# Patient Record
Sex: MALE | Race: WHITE | Employment: FULL TIME | URBAN - METROPOLITAN AREA
[De-identification: names, ages, dates, MRNs, and addresses within clinical notes are randomized per-mention and may not be internally consistent; named-entity substitution may affect disease eponyms.]

---

## 2019-06-06 DIAGNOSIS — M25.552 LEFT HIP PAIN: Primary | ICD-10-CM

## 2019-06-07 ENCOUNTER — OFFICE VISIT (OUTPATIENT)
Dept: ORTHOPEDIC SURGERY | Age: 59
End: 2019-06-07
Payer: COMMERCIAL

## 2019-06-07 VITALS — BODY MASS INDEX: 40.95 KG/M2 | HEIGHT: 73 IN | WEIGHT: 309 LBS

## 2019-06-07 DIAGNOSIS — Z01.818 PRE-OP TESTING: ICD-10-CM

## 2019-06-07 DIAGNOSIS — M16.12 ARTHRITIS OF LEFT HIP: Primary | ICD-10-CM

## 2019-06-07 PROCEDURE — 99203 OFFICE O/P NEW LOW 30 MIN: CPT | Performed by: ORTHOPAEDIC SURGERY

## 2019-06-07 RX ORDER — QUINAPRIL HCL AND HYDROCHLOROTHIAZIDE 20; 25 MG/1; MG/1
1 TABLET ORAL DAILY
COMMUNITY
Start: 2019-05-17

## 2019-06-07 RX ORDER — ASPIRIN 81 MG/1
81 TABLET, CHEWABLE ORAL DAILY
COMMUNITY

## 2019-06-07 RX ORDER — IBUPROFEN 800 MG/1
1 TABLET ORAL PRN
COMMUNITY
Start: 2019-02-15

## 2019-06-07 SDOH — HEALTH STABILITY: MENTAL HEALTH: HOW OFTEN DO YOU HAVE A DRINK CONTAINING ALCOHOL?: NEVER

## 2019-06-07 SDOH — HEALTH STABILITY: MENTAL HEALTH: HOW MANY STANDARD DRINKS CONTAINING ALCOHOL DO YOU HAVE ON A TYPICAL DAY?: 1 OR 2

## 2019-06-07 ASSESSMENT — ENCOUNTER SYMPTOMS
COUGH: 0
NAUSEA: 0
CONSTIPATION: 0
DIARRHEA: 0

## 2019-06-07 NOTE — PROGRESS NOTES
[address] [place] [address]  Dept: [phone]    Ambulatory Orthopedic New Patient Visit      CHIEF COMPLAINT:    Chief Complaint   Patient presents with    Hip Pain     left       HISTORY OF PRESENT ILLNESS:      The patient is a 66 y.o. male who is being seen  for consultation and evaluation of left hip pain. Ranjit Vidal presents with a 2 years history of pain in the left and bilateral hip. The pain does radiate into the thigh and into the groin. The pain is   made worse with weightbearing. The pain is worse at night. The pain is worse when laying on the affected side. The pain is made better with rest and ibuprofen. Patient has lost weight from 365lbs down to 275lbs but due to taking care of his wife recently he gained a few pounds. Patient said he will be under 40 by the time of surgery. Patient recently started metformin for his diabetes. He thinks is A1c is around 7.0. Past Medical History:    No past medical history on file. Past Surgical History:    No past surgical history on file. Current Medications:   Current Outpatient Medications   Medication Sig Dispense Refill    Misc. Devices MISC Shower Bench 1 Device 0    Misc. Devices MISC Rolling  Walker 1 Device 0    Misc. Devices MISC Bedside Commode 1 Device 0    ibuprofen (ADVIL;MOTRIN) 800 MG tablet Take 1 tablet by mouth as needed      sitaGLIPtan-metformin (JANUMET)  MG per tablet Take 1 tablet by mouth daily      aspirin (DULCE LOW DOSE) 81 MG chewable tablet Take 81 mg by mouth daily      quinapril-hydrochlorothiazide (ACCURETIC) 20-25 MG per tablet Take 1 tablet by mouth daily       No current facility-administered medications for this visit. Allergies:    Patient has no known allergies.     Social History:   Social History     Socioeconomic History    Marital status:      Spouse name: Not on file    Number of children: Not on file  Years of education: Not on file    Highest education level: Not on file   Occupational History    Not on file   Social Needs    Financial resource strain: Not on file    Food insecurity:     Worry: Not on file     Inability: Not on file    Transportation needs:     Medical: Not on file     Non-medical: Not on file   Tobacco Use    Smoking status: Never Smoker    Smokeless tobacco: Never Used   Substance and Sexual Activity    Alcohol use: Yes     Frequency: Never     Drinks per session: 1 or 2     Binge frequency: Never    Drug use: Never    Sexual activity: Not on file   Lifestyle    Physical activity:     Days per week: Not on file     Minutes per session: Not on file    Stress: Not on file   Relationships    Social connections:     Talks on phone: Not on file     Gets together: Not on file     Attends Latter day service: Not on file     Active member of club or organization: Not on file     Attends meetings of clubs or organizations: Not on file     Relationship status: Not on file    Intimate partner violence:     Fear of current or ex partner: Not on file     Emotionally abused: Not on file     Physically abused: Not on file     Forced sexual activity: Not on file   Other Topics Concern    Not on file   Social History Narrative    Not on file       Family History:  No family history on file. REVIEW OF SYSTEMS:  Review of Systems   Constitutional: Negative for chills and fever. Respiratory: Negative for cough. Gastrointestinal: Negative for constipation, diarrhea and nausea. Musculoskeletal: Positive for arthralgias (left hip). Negative for gait problem, joint swelling and myalgias. Neurological: Negative for dizziness, weakness and numbness. I have reviewed the CC, HPI, ROS, PMH, FHX, Social History. I agree with the documentation provided by other staff, residents and havereviewed their documentation prior to providing my signature indicating agreement.     PHYSICAL EXAM:  Ht 6' 1\" (1.854 m)   Wt (!) 309 lb (140.2 kg)   BMI 40.77 kg/m²  Body mass index is 40.77 kg/m². Physical Exam  Gen: alert and oriented  Psych:  Appropriate affect; Appropriate knowledge base; Appropriate mood; No hallucinations; Head: normocephalic atraumatic   Chest: symmetric chest excursion  Pelvis: stable  Ortho Exam  Extremity:Patient ambulates with mild shortening weightbearing phase and antalgic gait to the Left lower extremity. There is no erythema, warmth, skin lesions, signs of infection. Positive hip logroll and Stinchfield test is noted. Patient has full range of motion of the Left knee and ankle. Motor, sensory, and vascular examination to the Left lower extremity is intact without focal deficits. Patient has full range of motion of the lumbosacral spine. Radiology:     Xr Hip Left (2-3 Views)    Result Date: 6/9/2019  History: Left hip pain. Findings: Low AP pelvis and frog-leg lateral x-ray of the left hip done the office today shows severe degenerative narrowing of the left hip joint with periarticular osteophytosis and joint line sclerosis and subchondral sig take changes at the femoral head and acetabulum. No evidence of fracture, subluxation, dislocation, radiopaque foreign body, radiopaque tumor is noted. Moderate degenerative narrowing of the right hip joint is also appreciated. Impression: Severe left hip degenerative changes and moderate right hip degenerative changes as described above. ASSESSMENT:     1. Arthritis of left hip    2. Pre-op testing         PLAN:       Discussed etiology and natural history of left hip arthritis. The treatment options may include oral anti-inflammatories, bracing, injections, advanced imaging, activity modification, physical therapy and/or surgical intervention. The patient would like to proceed with left total hip arthroplasty. The patient will follow up two weeks post op.   We discussed that the patient should call us with any

## 2019-06-07 NOTE — LETTER
[address] [place] [address] [place] [address]  Dept: [phone]  Dept Fax: [phone]      Dental Evaluation for Surgery    Patient Name:  Alison Carlos    1960     Type of Surgery:   Left total hip replacement    Patient has been medically evaluated for the above surgery. __________He/She is void of any signs of infection or active periodontal disease, dental caries or the need for extractions. Oral health is satisfactory for the above procedure.      __________He/She is in need of further dental care and follow up. Thank you for your consultation. Should you have any questions, please do not hesitate to call the office.       Sincerely,         [facility]           Sign_____________________________________________Date__________________

## 2019-07-01 NOTE — CARE COORDINATION
[address] Replacement Pre-surgical Assessment    Patient Name:            Nicole Mitchell is a 66 y.o. male    Date of Surgery:  8/12  Procedure:   tka  Surgeon:   Shimon Hasnen Name:   spouse  Payor:    private  CJR Bundle? No  PCP:    Carter Hernandez  Last time seen PCP:   for clearance    Pharmacy Name/Location:        Intended Discharge Plan:  Home Spouse is a PT. He wants to do home program until first post op visit  Stated Goals of the surgery relieve pain    Living Situation:   Living arrangements: with spouse       Home: single family home   Bedroom Location: Upstairs - actually he [place] [address] [place] [address] [place] [address] Rd. But he has arranged for everything to be on first level   Bathroom Location: Upstairs   Steps: yes    # of steps to enter home 1 and then all at first will be on that level, walk in shower, no chair. Social support:a good social support network   Any concerns about safety in home? no    Risk Analysis:  Surgery Risks: None  Safety Risks: None identified   Chronic Illnesses: dm and htn. States controlled, working on A1c  Pre-Op Clearance received? Reviewing media for notes  PAT scheduled or completed? Yes   Pre-optimization needed? Waiting for updated a1c     Self-Care, Fall Risk & DME:   Any previous falls or injuries in the home? No   Visual Impairement:  corrected   Difficulty hearing: Within Normal Limit   Smoking: The patient denies any lifetime nicotine/tobacco use. Alcohol Consumption: occ   Patients judgement adequate to safely complete daily activities? yes   Patients memory adequate to safely complete daily activities? yes   Able to express needs/desires? yes   Any difficulty with activities of daily living? no    Does patient have difficulty walking or climbing stairs? Yes - has limp   Currently drives? Yes  Walks in Home: Independent  Uses any assistive devices?  No Device   Current home equipment: walker     Financial Assessment   Insurance: Commercial   Afford medications? yes   Adequate food? yes   Adequate housing? yes   Connected with any outside sevices (Passport, etc):No   Mode of transportation? car     Health Literacy Assessment   Primary Language: English   Does anyone help with medications at home?  no  Anything preventing from taking medications at home?  no    Barriers to an acceptable plan of care for this episode none  Patient is ready, willing and able to participate yes   Family or others are available, ready, willing and able to assist yes     Anticipated Needs Post Evaluation:  Patient intends to discharge to Outpatient Therapy - states he will do home program for 2 weeks and then, likely to [facility] (if applicable)  As above  Patient does not need a walker with wheels. Patient registered for pre-op class, will attend class on July 29 when he has pat. ..ibuprofen did jus tlook though and it looks like pat may be at [facility]. I will clarify this and may reschedule class. Patient is in agreement with the Plan.   Electronically signed by: Kamron Arias RN, Ortho Navigator on 7/1/2019 at 3:56 PM

## 2019-07-29 ENCOUNTER — HOSPITAL ENCOUNTER (OUTPATIENT)
Dept: PREADMISSION TESTING | Age: 59
Discharge: HOME | End: 2019-08-02
Payer: COMMERCIAL

## 2019-07-29 ENCOUNTER — HOSPITAL ENCOUNTER (OUTPATIENT)
Dept: PREADMISSION TESTING | Age: 59
Discharge: HOME | End: 2019-07-29

## 2019-07-29 ENCOUNTER — HOSPITAL ENCOUNTER (OUTPATIENT)
Dept: GENERAL RADIOLOGY | Age: 59
Discharge: HOME | End: 2019-07-31
Payer: COMMERCIAL

## 2019-07-29 VITALS
SYSTOLIC BLOOD PRESSURE: 104 MMHG | TEMPERATURE: 98.2 F | OXYGEN SATURATION: 96 % | RESPIRATION RATE: 18 BRPM | BODY MASS INDEX: 37.91 KG/M2 | DIASTOLIC BLOOD PRESSURE: 66 MMHG | HEART RATE: 68 BPM | HEIGHT: 73 IN | WEIGHT: 286 LBS

## 2019-07-29 LAB
ALBUMIN SERPL-MCNC: 4.5 G/DL (ref 3.5–5.2)
ALBUMIN/GLOBULIN RATIO: 1.7 (ref 1–2.5)
ALP BLD-CCNC: 35 U/L (ref 40–129)
ALT SERPL-CCNC: 21 U/L (ref 5–41)
ANION GAP SERPL CALCULATED.3IONS-SCNC: 15 MMOL/L (ref 9–17)
AST SERPL-CCNC: 17 U/L
BILIRUB SERPL-MCNC: 0.73 MG/DL (ref 0.3–1.2)
BILIRUBIN URINE: NEGATIVE
BUN BLDV-MCNC: 12 MG/DL (ref 6–20)
BUN/CREAT BLD: ABNORMAL (ref 9–20)
CALCIUM SERPL-MCNC: 9.5 MG/DL (ref 8.6–10.4)
CHLORIDE BLD-SCNC: 102 MMOL/L (ref 98–107)
CO2: 24 MMOL/L (ref 20–31)
COLOR: YELLOW
COMMENT UA: ABNORMAL
CREAT SERPL-MCNC: 0.84 MG/DL (ref 0.7–1.2)
EKG ATRIAL RATE: 63 BPM
EKG P AXIS: 55 DEGREES
EKG P-R INTERVAL: 218 MS
EKG Q-T INTERVAL: 434 MS
EKG QRS DURATION: 90 MS
EKG QTC CALCULATION (BAZETT): 444 MS
EKG R AXIS: 28 DEGREES
EKG T AXIS: 7 DEGREES
EKG VENTRICULAR RATE: 63 BPM
GFR AFRICAN AMERICAN: >60 ML/MIN
GFR NON-AFRICAN AMERICAN: >60 ML/MIN
GFR SERPL CREATININE-BSD FRML MDRD: ABNORMAL ML/MIN/{1.73_M2}
GFR SERPL CREATININE-BSD FRML MDRD: ABNORMAL ML/MIN/{1.73_M2}
GLUCOSE BLD-MCNC: 116 MG/DL (ref 70–99)
GLUCOSE URINE: NEGATIVE
HCT VFR BLD CALC: 43.5 % (ref 40.7–50.3)
HEMOGLOBIN: 14.1 G/DL (ref 13–17)
KETONES, URINE: ABNORMAL
LEUKOCYTE ESTERASE, URINE: NEGATIVE
MCH RBC QN AUTO: 27.9 PG (ref 25.2–33.5)
MCHC RBC AUTO-ENTMCNC: 32.4 G/DL (ref 28.4–34.8)
MCV RBC AUTO: 86.1 FL (ref 82.6–102.9)
NITRITE, URINE: NEGATIVE
NRBC AUTOMATED: 0 PER 100 WBC
PDW BLD-RTO: 13 % (ref 11.8–14.4)
PH UA: 6 (ref 5–8)
PLATELET # BLD: 245 K/UL (ref 138–453)
PMV BLD AUTO: 10 FL (ref 8.1–13.5)
POTASSIUM SERPL-SCNC: 3.6 MMOL/L (ref 3.7–5.3)
PROTEIN UA: NEGATIVE
RBC # BLD: 5.05 M/UL (ref 4.21–5.77)
SODIUM BLD-SCNC: 141 MMOL/L (ref 135–144)
SPECIFIC GRAVITY UA: 1.01 (ref 1–1.03)
TOTAL PROTEIN: 7.1 G/DL (ref 6.4–8.3)
TURBIDITY: CLEAR
URINE HGB: NEGATIVE
UROBILINOGEN, URINE: NORMAL
WBC # BLD: 7.6 K/UL (ref 3.5–11.3)

## 2019-07-29 PROCEDURE — 81003 URINALYSIS AUTO W/O SCOPE: CPT

## 2019-07-29 PROCEDURE — 85027 COMPLETE CBC AUTOMATED: CPT

## 2019-07-29 PROCEDURE — 36415 COLL VENOUS BLD VENIPUNCTURE: CPT

## 2019-07-29 PROCEDURE — 93005 ELECTROCARDIOGRAM TRACING: CPT | Performed by: ORTHOPAEDIC SURGERY

## 2019-07-29 PROCEDURE — 71046 X-RAY EXAM CHEST 2 VIEWS: CPT

## 2019-07-29 PROCEDURE — 87641 MR-STAPH DNA AMP PROBE: CPT

## 2019-07-29 PROCEDURE — 80053 COMPREHEN METABOLIC PANEL: CPT

## 2019-07-29 RX ORDER — SILDENAFIL 100 MG/1
100 TABLET, FILM COATED ORAL PRN
COMMUNITY
Start: 2018-12-21

## 2019-07-29 RX ORDER — SODIUM CHLORIDE, SODIUM LACTATE, POTASSIUM CHLORIDE, CALCIUM CHLORIDE 600; 310; 30; 20 MG/100ML; MG/100ML; MG/100ML; MG/100ML
1000 INJECTION, SOLUTION INTRAVENOUS CONTINUOUS
Status: CANCELLED | OUTPATIENT
Start: 2019-07-29

## 2019-07-29 SDOH — HEALTH STABILITY: MENTAL HEALTH: HOW OFTEN DO YOU HAVE A DRINK CONTAINING ALCOHOL?: MONTHLY OR LESS

## 2019-07-29 ASSESSMENT — PAIN DESCRIPTION - DESCRIPTORS: DESCRIPTORS: CONSTANT;ACHING

## 2019-07-29 ASSESSMENT — PAIN DESCRIPTION - ORIENTATION: ORIENTATION: LEFT

## 2019-07-29 ASSESSMENT — PAIN DESCRIPTION - PAIN TYPE: TYPE: CHRONIC PAIN

## 2019-07-29 ASSESSMENT — PAIN DESCRIPTION - PROGRESSION: CLINICAL_PROGRESSION: GRADUALLY WORSENING

## 2019-07-29 ASSESSMENT — PAIN DESCRIPTION - ONSET: ONSET: ON-GOING

## 2019-07-29 ASSESSMENT — PAIN DESCRIPTION - FREQUENCY: FREQUENCY: CONTINUOUS

## 2019-07-29 ASSESSMENT — PAIN DESCRIPTION - LOCATION: LOCATION: HIP

## 2019-07-29 ASSESSMENT — PAIN SCALES - GENERAL: PAINLEVEL_OUTOF10: 5

## 2019-07-30 ENCOUNTER — ANESTHESIA EVENT (OUTPATIENT)
Dept: OPERATING ROOM | Age: 59
DRG: 470 | End: 2019-07-30
Payer: COMMERCIAL

## 2019-07-30 LAB
MRSA, DNA, NASAL: NORMAL
SPECIMEN DESCRIPTION: NORMAL

## 2019-08-01 DIAGNOSIS — Z01.818 PRE-OP TESTING: Primary | ICD-10-CM

## 2019-08-05 DIAGNOSIS — M16.12 ARTHRITIS OF LEFT HIP: Primary | ICD-10-CM

## 2019-08-12 ENCOUNTER — ANESTHESIA (OUTPATIENT)
Dept: OPERATING ROOM | Age: 59
DRG: 470 | End: 2019-08-12
Payer: COMMERCIAL

## 2019-08-12 ENCOUNTER — APPOINTMENT (OUTPATIENT)
Dept: GENERAL RADIOLOGY | Age: 59
DRG: 470 | End: 2019-08-12
Attending: ORTHOPAEDIC SURGERY
Payer: COMMERCIAL

## 2019-08-12 ENCOUNTER — HOSPITAL ENCOUNTER (INPATIENT)
Age: 59
LOS: 1 days | Discharge: HOME | DRG: 470 | End: 2019-08-13
Attending: ORTHOPAEDIC SURGERY | Admitting: ORTHOPAEDIC SURGERY
Payer: COMMERCIAL

## 2019-08-12 VITALS — DIASTOLIC BLOOD PRESSURE: 52 MMHG | OXYGEN SATURATION: 97 % | SYSTOLIC BLOOD PRESSURE: 86 MMHG | TEMPERATURE: 94.8 F

## 2019-08-12 DIAGNOSIS — Z96.642 STATUS POST TOTAL REPLACEMENT OF LEFT HIP: Primary | ICD-10-CM

## 2019-08-12 PROBLEM — M16.12 ARTHRITIS OF LEFT HIP: Status: ACTIVE | Noted: 2019-06-01

## 2019-08-12 PROBLEM — E87.6 HYPOKALEMIA: Status: ACTIVE | Noted: 2019-08-12

## 2019-08-12 LAB
GLUCOSE BLD-MCNC: 139 MG/DL (ref 75–110)
GLUCOSE BLD-MCNC: 163 MG/DL (ref 75–110)
GLUCOSE BLD-MCNC: 171 MG/DL (ref 75–110)
GLUCOSE BLD-MCNC: 195 MG/DL (ref 75–110)
GLUCOSE BLD-MCNC: 219 MG/DL (ref 75–110)
POTASSIUM SERPL-SCNC: 4.1 MMOL/L (ref 3.7–5.3)

## 2019-08-12 PROCEDURE — 2720000010 HC SURG SUPPLY STERILE: Performed by: ORTHOPAEDIC SURGERY

## 2019-08-12 PROCEDURE — 6360000002 HC RX W HCPCS: Performed by: ORTHOPAEDIC SURGERY

## 2019-08-12 PROCEDURE — 99253 IP/OBS CNSLTJ NEW/EST LOW 45: CPT | Performed by: INTERNAL MEDICINE

## 2019-08-12 PROCEDURE — 6370000000 HC RX 637 (ALT 250 FOR IP): Performed by: ANESTHESIOLOGY

## 2019-08-12 PROCEDURE — 2500000003 HC RX 250 WO HCPCS: Performed by: NURSE ANESTHETIST, CERTIFIED REGISTERED

## 2019-08-12 PROCEDURE — 7100000001 HC PACU RECOVERY - ADDTL 15 MIN: Performed by: ORTHOPAEDIC SURGERY

## 2019-08-12 PROCEDURE — 6370000000 HC RX 637 (ALT 250 FOR IP): Performed by: STUDENT IN AN ORGANIZED HEALTH CARE EDUCATION/TRAINING PROGRAM

## 2019-08-12 PROCEDURE — 6360000002 HC RX W HCPCS: Performed by: STUDENT IN AN ORGANIZED HEALTH CARE EDUCATION/TRAINING PROGRAM

## 2019-08-12 PROCEDURE — 2580000003 HC RX 258: Performed by: STUDENT IN AN ORGANIZED HEALTH CARE EDUCATION/TRAINING PROGRAM

## 2019-08-12 PROCEDURE — 3209999900 FLUORO FOR SURGICAL PROCEDURES

## 2019-08-12 PROCEDURE — 97110 THERAPEUTIC EXERCISES: CPT

## 2019-08-12 PROCEDURE — 2500000003 HC RX 250 WO HCPCS: Performed by: ORTHOPAEDIC SURGERY

## 2019-08-12 PROCEDURE — 3700000001 HC ADD 15 MINUTES (ANESTHESIA): Performed by: ORTHOPAEDIC SURGERY

## 2019-08-12 PROCEDURE — 0SRB03Z REPLACEMENT OF LEFT HIP JOINT WITH CERAMIC SYNTHETIC SUBSTITUTE, OPEN APPROACH: ICD-10-PCS | Performed by: ORTHOPAEDIC SURGERY

## 2019-08-12 PROCEDURE — 6370000000 HC RX 637 (ALT 250 FOR IP)

## 2019-08-12 PROCEDURE — 3600000013 HC SURGERY LEVEL 3 ADDTL 15MIN: Performed by: ORTHOPAEDIC SURGERY

## 2019-08-12 PROCEDURE — 97116 GAIT TRAINING THERAPY: CPT

## 2019-08-12 PROCEDURE — 2580000003 HC RX 258: Performed by: ORTHOPAEDIC SURGERY

## 2019-08-12 PROCEDURE — 6370000000 HC RX 637 (ALT 250 FOR IP): Performed by: INTERNAL MEDICINE

## 2019-08-12 PROCEDURE — 3600000003 HC SURGERY LEVEL 3 BASE: Performed by: ORTHOPAEDIC SURGERY

## 2019-08-12 PROCEDURE — C1776 JOINT DEVICE (IMPLANTABLE): HCPCS | Performed by: ORTHOPAEDIC SURGERY

## 2019-08-12 PROCEDURE — 1200000000 HC SEMI PRIVATE

## 2019-08-12 PROCEDURE — 2709999900 HC NON-CHARGEABLE SUPPLY: Performed by: ORTHOPAEDIC SURGERY

## 2019-08-12 PROCEDURE — 7100000000 HC PACU RECOVERY - FIRST 15 MIN: Performed by: ORTHOPAEDIC SURGERY

## 2019-08-12 PROCEDURE — 3700000000 HC ANESTHESIA ATTENDED CARE: Performed by: ORTHOPAEDIC SURGERY

## 2019-08-12 PROCEDURE — 73502 X-RAY EXAM HIP UNI 2-3 VIEWS: CPT

## 2019-08-12 PROCEDURE — 2580000003 HC RX 258: Performed by: ANESTHESIOLOGY

## 2019-08-12 PROCEDURE — 6360000002 HC RX W HCPCS: Performed by: NURSE ANESTHETIST, CERTIFIED REGISTERED

## 2019-08-12 PROCEDURE — 82947 ASSAY GLUCOSE BLOOD QUANT: CPT

## 2019-08-12 PROCEDURE — 2500000003 HC RX 250 WO HCPCS: Performed by: ANESTHESIOLOGY

## 2019-08-12 PROCEDURE — 97162 PT EVAL MOD COMPLEX 30 MIN: CPT

## 2019-08-12 PROCEDURE — 84132 ASSAY OF SERUM POTASSIUM: CPT

## 2019-08-12 PROCEDURE — 76942 ECHO GUIDE FOR BIOPSY: CPT | Performed by: ANESTHESIOLOGY

## 2019-08-12 PROCEDURE — 27130 TOTAL HIP ARTHROPLASTY: CPT | Performed by: ORTHOPAEDIC SURGERY

## 2019-08-12 PROCEDURE — 2580000003 HC RX 258: Performed by: NURSE ANESTHETIST, CERTIFIED REGISTERED

## 2019-08-12 PROCEDURE — 36415 COLL VENOUS BLD VENIPUNCTURE: CPT

## 2019-08-12 PROCEDURE — 97535 SELF CARE MNGMENT TRAINING: CPT

## 2019-08-12 PROCEDURE — 97166 OT EVAL MOD COMPLEX 45 MIN: CPT

## 2019-08-12 DEVICE — CEMENTLESS ANATOMICAL STEM LEFT SIZE 5
Type: IMPLANTABLE DEVICE | Status: FUNCTIONAL
Brand: MINIMAX STEMS

## 2019-08-12 DEVICE — COMPONENT TOT HIP CAPPED ADV STRYHIPA] STRYKER CORP]: Type: IMPLANTABLE DEVICE | Status: FUNCTIONAL

## 2019-08-12 DEVICE — FEMORAL HEAD Ø 36 SIZE M
Type: IMPLANTABLE DEVICE | Status: FUNCTIONAL
Brand: MECTACER BIOLOX DELTA FEMORAL BALL HEAD

## 2019-08-12 DEVICE — FLAT PE  HC LINER Ø 36 / F
Type: IMPLANTABLE DEVICE | Status: FUNCTIONAL
Brand: MPACT ACETABULAR SYSTEM

## 2019-08-12 DEVICE — ACETABULAR SHELL Ø58 TWO HOLES
Type: IMPLANTABLE DEVICE | Status: FUNCTIONAL
Brand: MPACT ACETABULAR SYSTEM

## 2019-08-12 RX ORDER — DEXTROSE MONOHYDRATE 25 G/50ML
12.5 INJECTION, SOLUTION INTRAVENOUS PRN
Status: DISCONTINUED | OUTPATIENT
Start: 2019-08-12 | End: 2019-08-13 | Stop reason: HOSPADM

## 2019-08-12 RX ORDER — DOCUSATE SODIUM 100 MG/1
100 CAPSULE, LIQUID FILLED ORAL 2 TIMES DAILY PRN
Qty: 60 CAPSULE | Refills: 0 | Status: ON HOLD | OUTPATIENT
Start: 2019-08-12 | End: 2022-09-09 | Stop reason: HOSPADM

## 2019-08-12 RX ORDER — DEXTROSE MONOHYDRATE 50 MG/ML
100 INJECTION, SOLUTION INTRAVENOUS PRN
Status: DISCONTINUED | OUTPATIENT
Start: 2019-08-12 | End: 2019-08-13 | Stop reason: HOSPADM

## 2019-08-12 RX ORDER — SODIUM CHLORIDE 0.9 % (FLUSH) 0.9 %
10 SYRINGE (ML) INJECTION EVERY 12 HOURS SCHEDULED
Status: DISCONTINUED | OUTPATIENT
Start: 2019-08-12 | End: 2019-08-13 | Stop reason: HOSPADM

## 2019-08-12 RX ORDER — QUINAPRIL HCL AND HYDROCHLOROTHIAZIDE 20; 25 MG/1; MG/1
1 TABLET ORAL DAILY
Status: DISCONTINUED | OUTPATIENT
Start: 2019-08-12 | End: 2019-08-13 | Stop reason: HOSPADM

## 2019-08-12 RX ORDER — SODIUM CHLORIDE, SODIUM LACTATE, POTASSIUM CHLORIDE, CALCIUM CHLORIDE 600; 310; 30; 20 MG/100ML; MG/100ML; MG/100ML; MG/100ML
INJECTION, SOLUTION INTRAVENOUS CONTINUOUS PRN
Status: DISCONTINUED | OUTPATIENT
Start: 2019-08-12 | End: 2019-08-12 | Stop reason: SDUPTHER

## 2019-08-12 RX ORDER — TRAMADOL HYDROCHLORIDE 50 MG/1
50 TABLET ORAL EVERY 6 HOURS
Status: DISCONTINUED | OUTPATIENT
Start: 2019-08-12 | End: 2019-08-12

## 2019-08-12 RX ORDER — OXYCODONE HYDROCHLORIDE AND ACETAMINOPHEN 5; 325 MG/1; MG/1
1 TABLET ORAL EVERY 6 HOURS PRN
Qty: 28 TABLET | Refills: 0 | Status: SHIPPED | OUTPATIENT
Start: 2019-08-12 | End: 2019-08-19

## 2019-08-12 RX ORDER — MIDAZOLAM HYDROCHLORIDE 1 MG/ML
INJECTION INTRAMUSCULAR; INTRAVENOUS PRN
Status: DISCONTINUED | OUTPATIENT
Start: 2019-08-12 | End: 2019-08-12 | Stop reason: SDUPTHER

## 2019-08-12 RX ORDER — PROPOFOL 10 MG/ML
INJECTION, EMULSION INTRAVENOUS CONTINUOUS PRN
Status: DISCONTINUED | OUTPATIENT
Start: 2019-08-12 | End: 2019-08-12 | Stop reason: SDUPTHER

## 2019-08-12 RX ORDER — ACETAMINOPHEN 500 MG
1000 TABLET ORAL EVERY 6 HOURS
Status: DISCONTINUED | OUTPATIENT
Start: 2019-08-12 | End: 2019-08-13 | Stop reason: HOSPADM

## 2019-08-12 RX ORDER — BUPIVACAINE HYDROCHLORIDE 2.5 MG/ML
INJECTION, SOLUTION EPIDURAL; INFILTRATION; INTRACAUDAL
Status: DISCONTINUED
Start: 2019-08-12 | End: 2019-08-12

## 2019-08-12 RX ORDER — NICOTINE POLACRILEX 4 MG
15 LOZENGE BUCCAL PRN
Status: DISCONTINUED | OUTPATIENT
Start: 2019-08-12 | End: 2019-08-13 | Stop reason: HOSPADM

## 2019-08-12 RX ORDER — GLYCOPYRROLATE 1 MG/5 ML
SYRINGE (ML) INTRAVENOUS PRN
Status: DISCONTINUED | OUTPATIENT
Start: 2019-08-12 | End: 2019-08-12 | Stop reason: SDUPTHER

## 2019-08-12 RX ORDER — SODIUM CHLORIDE 0.9 % (FLUSH) 0.9 %
10 SYRINGE (ML) INJECTION PRN
Status: DISCONTINUED | OUTPATIENT
Start: 2019-08-12 | End: 2019-08-12

## 2019-08-12 RX ORDER — ASPIRIN 81 MG/1
81 TABLET, CHEWABLE ORAL DAILY
Status: DISCONTINUED | OUTPATIENT
Start: 2019-08-12 | End: 2019-08-13 | Stop reason: HOSPADM

## 2019-08-12 RX ORDER — SODIUM CHLORIDE 9 MG/ML
INJECTION, SOLUTION INTRAVENOUS CONTINUOUS
Status: DISCONTINUED | OUTPATIENT
Start: 2019-08-12 | End: 2019-08-12

## 2019-08-12 RX ORDER — TRAMADOL HYDROCHLORIDE 50 MG/1
TABLET ORAL
Status: COMPLETED
Start: 2019-08-12 | End: 2019-08-12

## 2019-08-12 RX ORDER — OXYCODONE HYDROCHLORIDE 5 MG/1
5 TABLET ORAL EVERY 4 HOURS PRN
Status: DISCONTINUED | OUTPATIENT
Start: 2019-08-12 | End: 2019-08-13 | Stop reason: HOSPADM

## 2019-08-12 RX ORDER — MIDAZOLAM HYDROCHLORIDE 1 MG/ML
2 INJECTION INTRAMUSCULAR; INTRAVENOUS
Status: DISCONTINUED | OUTPATIENT
Start: 2019-08-12 | End: 2019-08-12

## 2019-08-12 RX ORDER — CELECOXIB 100 MG/1
200 CAPSULE ORAL ONCE
Status: COMPLETED | OUTPATIENT
Start: 2019-08-12 | End: 2019-08-12

## 2019-08-12 RX ORDER — TRAMADOL HYDROCHLORIDE 50 MG/1
50 TABLET ORAL ONCE
Status: COMPLETED | OUTPATIENT
Start: 2019-08-12 | End: 2019-08-12

## 2019-08-12 RX ORDER — ONDANSETRON 2 MG/ML
4 INJECTION INTRAMUSCULAR; INTRAVENOUS
Status: DISCONTINUED | OUTPATIENT
Start: 2019-08-12 | End: 2019-08-12

## 2019-08-12 RX ORDER — HYDROCODONE BITARTRATE AND ACETAMINOPHEN 5; 325 MG/1; MG/1
1 TABLET ORAL PRN
Status: COMPLETED | OUTPATIENT
Start: 2019-08-12 | End: 2019-08-12

## 2019-08-12 RX ORDER — GABAPENTIN 400 MG/1
800 CAPSULE ORAL ONCE
Status: COMPLETED | OUTPATIENT
Start: 2019-08-12 | End: 2019-08-12

## 2019-08-12 RX ORDER — ACETAMINOPHEN 500 MG
TABLET ORAL
Status: DISCONTINUED
Start: 2019-08-12 | End: 2019-08-12

## 2019-08-12 RX ORDER — SODIUM CHLORIDE 0.9 % (FLUSH) 0.9 %
10 SYRINGE (ML) INJECTION EVERY 12 HOURS SCHEDULED
Status: DISCONTINUED | OUTPATIENT
Start: 2019-08-12 | End: 2019-08-12

## 2019-08-12 RX ORDER — HYDROCODONE BITARTRATE AND ACETAMINOPHEN 5; 325 MG/1; MG/1
2 TABLET ORAL PRN
Status: COMPLETED | OUTPATIENT
Start: 2019-08-12 | End: 2019-08-12

## 2019-08-12 RX ORDER — MORPHINE SULFATE 1 MG/ML
1 INJECTION, SOLUTION EPIDURAL; INTRATHECAL; INTRAVENOUS EVERY 5 MIN PRN
Status: DISCONTINUED | OUTPATIENT
Start: 2019-08-12 | End: 2019-08-12

## 2019-08-12 RX ORDER — PROMETHAZINE HYDROCHLORIDE 25 MG/ML
6.25 INJECTION, SOLUTION INTRAMUSCULAR; INTRAVENOUS
Status: DISCONTINUED | OUTPATIENT
Start: 2019-08-12 | End: 2019-08-12

## 2019-08-12 RX ORDER — KETOROLAC TROMETHAMINE 30 MG/ML
30 INJECTION, SOLUTION INTRAMUSCULAR; INTRAVENOUS EVERY 6 HOURS
Status: DISCONTINUED | OUTPATIENT
Start: 2019-08-12 | End: 2019-08-13 | Stop reason: HOSPADM

## 2019-08-12 RX ORDER — LIDOCAINE HYDROCHLORIDE 10 MG/ML
INJECTION, SOLUTION EPIDURAL; INFILTRATION; INTRACAUDAL; PERINEURAL PRN
Status: DISCONTINUED | OUTPATIENT
Start: 2019-08-12 | End: 2019-08-12 | Stop reason: SDUPTHER

## 2019-08-12 RX ORDER — FENTANYL CITRATE 50 UG/ML
INJECTION, SOLUTION INTRAMUSCULAR; INTRAVENOUS PRN
Status: DISCONTINUED | OUTPATIENT
Start: 2019-08-12 | End: 2019-08-12 | Stop reason: SDUPTHER

## 2019-08-12 RX ORDER — GABAPENTIN 400 MG/1
CAPSULE ORAL
Status: COMPLETED
Start: 2019-08-12 | End: 2019-08-12

## 2019-08-12 RX ORDER — FENTANYL CITRATE 50 UG/ML
25 INJECTION, SOLUTION INTRAMUSCULAR; INTRAVENOUS EVERY 5 MIN PRN
Status: DISCONTINUED | OUTPATIENT
Start: 2019-08-12 | End: 2019-08-12

## 2019-08-12 RX ORDER — SODIUM CHLORIDE 0.9 % (FLUSH) 0.9 %
10 SYRINGE (ML) INJECTION PRN
Status: DISCONTINUED | OUTPATIENT
Start: 2019-08-12 | End: 2019-08-13 | Stop reason: HOSPADM

## 2019-08-12 RX ORDER — CELECOXIB 100 MG/1
CAPSULE ORAL
Status: COMPLETED
Start: 2019-08-12 | End: 2019-08-12

## 2019-08-12 RX ORDER — OXYCODONE HYDROCHLORIDE 5 MG/1
10 TABLET ORAL EVERY 4 HOURS PRN
Status: DISCONTINUED | OUTPATIENT
Start: 2019-08-12 | End: 2019-08-13 | Stop reason: HOSPADM

## 2019-08-12 RX ORDER — TRANEXAMIC ACID 100 MG/ML
INJECTION, SOLUTION INTRAVENOUS
Status: DISPENSED
Start: 2019-08-12 | End: 2019-08-12

## 2019-08-12 RX ORDER — PHENYLEPHRINE HYDROCHLORIDE 10 MG/ML
INJECTION INTRAVENOUS PRN
Status: DISCONTINUED | OUTPATIENT
Start: 2019-08-12 | End: 2019-08-12 | Stop reason: SDUPTHER

## 2019-08-12 RX ORDER — MAGNESIUM HYDROXIDE 1200 MG/15ML
LIQUID ORAL CONTINUOUS PRN
Status: COMPLETED | OUTPATIENT
Start: 2019-08-12 | End: 2019-08-12

## 2019-08-12 RX ORDER — HYDRALAZINE HYDROCHLORIDE 20 MG/ML
5 INJECTION INTRAMUSCULAR; INTRAVENOUS EVERY 10 MIN PRN
Status: DISCONTINUED | OUTPATIENT
Start: 2019-08-12 | End: 2019-08-12

## 2019-08-12 RX ORDER — DIPHENHYDRAMINE HYDROCHLORIDE 50 MG/ML
12.5 INJECTION INTRAMUSCULAR; INTRAVENOUS
Status: DISCONTINUED | OUTPATIENT
Start: 2019-08-12 | End: 2019-08-12

## 2019-08-12 RX ORDER — SCOLOPAMINE TRANSDERMAL SYSTEM 1 MG/1
1 PATCH, EXTENDED RELEASE TRANSDERMAL ONCE
Status: DISCONTINUED | OUTPATIENT
Start: 2019-08-12 | End: 2019-08-12

## 2019-08-12 RX ORDER — SCOLOPAMINE TRANSDERMAL SYSTEM 1 MG/1
PATCH, EXTENDED RELEASE TRANSDERMAL
Status: DISCONTINUED
Start: 2019-08-12 | End: 2019-08-13 | Stop reason: HOSPADM

## 2019-08-12 RX ORDER — GABAPENTIN 300 MG/1
300 CAPSULE ORAL NIGHTLY
Status: DISCONTINUED | OUTPATIENT
Start: 2019-08-12 | End: 2019-08-13 | Stop reason: HOSPADM

## 2019-08-12 RX ORDER — POLYETHYLENE GLYCOL 3350 17 G/17G
17 POWDER, FOR SOLUTION ORAL DAILY PRN
Status: DISCONTINUED | OUTPATIENT
Start: 2019-08-12 | End: 2019-08-13 | Stop reason: HOSPADM

## 2019-08-12 RX ORDER — ACETAMINOPHEN 500 MG
1000 TABLET ORAL ONCE
Status: DISCONTINUED | OUTPATIENT
Start: 2019-08-12 | End: 2019-08-12

## 2019-08-12 RX ORDER — ONDANSETRON 2 MG/ML
4 INJECTION INTRAMUSCULAR; INTRAVENOUS EVERY 6 HOURS PRN
Status: DISCONTINUED | OUTPATIENT
Start: 2019-08-12 | End: 2019-08-13 | Stop reason: HOSPADM

## 2019-08-12 RX ORDER — PROPOFOL 10 MG/ML
INJECTION, EMULSION INTRAVENOUS
Status: COMPLETED
Start: 2019-08-12 | End: 2019-08-12

## 2019-08-12 RX ORDER — DOCUSATE SODIUM 100 MG/1
100 CAPSULE, LIQUID FILLED ORAL 2 TIMES DAILY
Status: DISCONTINUED | OUTPATIENT
Start: 2019-08-12 | End: 2019-08-13 | Stop reason: HOSPADM

## 2019-08-12 RX ORDER — SODIUM CHLORIDE, SODIUM LACTATE, POTASSIUM CHLORIDE, CALCIUM CHLORIDE 600; 310; 30; 20 MG/100ML; MG/100ML; MG/100ML; MG/100ML
1000 INJECTION, SOLUTION INTRAVENOUS CONTINUOUS
Status: DISCONTINUED | OUTPATIENT
Start: 2019-08-12 | End: 2019-08-12

## 2019-08-12 RX ORDER — SODIUM CHLORIDE 9 MG/ML
INJECTION, SOLUTION INTRAVENOUS CONTINUOUS
Status: DISCONTINUED | OUTPATIENT
Start: 2019-08-12 | End: 2019-08-13 | Stop reason: HOSPADM

## 2019-08-12 RX ORDER — MEPERIDINE HYDROCHLORIDE 50 MG/ML
12.5 INJECTION INTRAMUSCULAR; INTRAVENOUS; SUBCUTANEOUS EVERY 5 MIN PRN
Status: DISCONTINUED | OUTPATIENT
Start: 2019-08-12 | End: 2019-08-12

## 2019-08-12 RX ORDER — BUPIVACAINE HYDROCHLORIDE 2.5 MG/ML
INJECTION, SOLUTION EPIDURAL; INFILTRATION; INTRACAUDAL
Status: COMPLETED | OUTPATIENT
Start: 2019-08-12 | End: 2019-08-12

## 2019-08-12 RX ORDER — SODIUM CHLORIDE, SODIUM LACTATE, POTASSIUM CHLORIDE, CALCIUM CHLORIDE 600; 310; 30; 20 MG/100ML; MG/100ML; MG/100ML; MG/100ML
INJECTION, SOLUTION INTRAVENOUS CONTINUOUS
Status: DISCONTINUED | OUTPATIENT
Start: 2019-08-12 | End: 2019-08-12

## 2019-08-12 RX ADMIN — DOCUSATE SODIUM 100 MG: 100 CAPSULE, LIQUID FILLED ORAL at 14:56

## 2019-08-12 RX ADMIN — PHENYLEPHRINE HYDROCHLORIDE 100 MCG: 10 INJECTION INTRAVENOUS at 09:40

## 2019-08-12 RX ADMIN — KETOROLAC TROMETHAMINE 30 MG: 30 INJECTION, SOLUTION INTRAMUSCULAR; INTRAVENOUS at 21:16

## 2019-08-12 RX ADMIN — PHENYLEPHRINE HYDROCHLORIDE 100 MCG: 10 INJECTION INTRAVENOUS at 09:31

## 2019-08-12 RX ADMIN — Medication 3 G: at 09:29

## 2019-08-12 RX ADMIN — SODIUM CHLORIDE: 9 INJECTION, SOLUTION INTRAVENOUS at 13:27

## 2019-08-12 RX ADMIN — HYDROCODONE BITARTRATE AND ACETAMINOPHEN 1 TABLET: 5; 325 TABLET ORAL at 12:12

## 2019-08-12 RX ADMIN — PROPOFOL 130 MCG/KG/MIN: 10 INJECTION, EMULSION INTRAVENOUS at 09:26

## 2019-08-12 RX ADMIN — KETOROLAC TROMETHAMINE 30 MG: 30 INJECTION, SOLUTION INTRAMUSCULAR; INTRAVENOUS at 13:50

## 2019-08-12 RX ADMIN — OXYCODONE HYDROCHLORIDE 10 MG: 5 TABLET ORAL at 14:56

## 2019-08-12 RX ADMIN — PHENYLEPHRINE HYDROCHLORIDE 100 MCG: 10 INJECTION INTRAVENOUS at 10:14

## 2019-08-12 RX ADMIN — INSULIN LISPRO 1 UNITS: 100 INJECTION, SOLUTION INTRAVENOUS; SUBCUTANEOUS at 21:38

## 2019-08-12 RX ADMIN — MIDAZOLAM HYDROCHLORIDE 2 MG: 1 INJECTION, SOLUTION INTRAMUSCULAR; INTRAVENOUS at 09:04

## 2019-08-12 RX ADMIN — OXYCODONE HYDROCHLORIDE 10 MG: 5 TABLET ORAL at 23:26

## 2019-08-12 RX ADMIN — TRAMADOL HYDROCHLORIDE 50 MG: 50 TABLET ORAL at 07:23

## 2019-08-12 RX ADMIN — PHENYLEPHRINE HYDROCHLORIDE 100 MCG: 10 INJECTION INTRAVENOUS at 10:04

## 2019-08-12 RX ADMIN — PHENYLEPHRINE HYDROCHLORIDE 100 MCG: 10 INJECTION INTRAVENOUS at 09:36

## 2019-08-12 RX ADMIN — CELECOXIB 200 MG: 100 CAPSULE ORAL at 07:24

## 2019-08-12 RX ADMIN — ASPIRIN 81 MG 81 MG: 81 TABLET ORAL at 14:56

## 2019-08-12 RX ADMIN — Medication 10 ML: at 21:17

## 2019-08-12 RX ADMIN — DOCUSATE SODIUM 100 MG: 100 CAPSULE, LIQUID FILLED ORAL at 21:17

## 2019-08-12 RX ADMIN — FENTANYL CITRATE 50 MCG: 50 INJECTION, SOLUTION INTRAMUSCULAR; INTRAVENOUS at 09:26

## 2019-08-12 RX ADMIN — ACETAMINOPHEN 1000 MG: 500 TABLET ORAL at 19:27

## 2019-08-12 RX ADMIN — PHENYLEPHRINE HYDROCHLORIDE 100 MCG: 10 INJECTION INTRAVENOUS at 09:48

## 2019-08-12 RX ADMIN — LIDOCAINE HYDROCHLORIDE 30 MG: 10 INJECTION, SOLUTION EPIDURAL; INFILTRATION; INTRACAUDAL; PERINEURAL at 09:26

## 2019-08-12 RX ADMIN — BUPIVACAINE HYDROCHLORIDE 45 ML: 2.5 INJECTION, SOLUTION EPIDURAL; INFILTRATION; INTRACAUDAL; PERINEURAL at 08:47

## 2019-08-12 RX ADMIN — TRANEXAMIC ACID 1 G: 100 INJECTION, SOLUTION INTRAVENOUS at 11:20

## 2019-08-12 RX ADMIN — SODIUM CHLORIDE, POTASSIUM CHLORIDE, SODIUM LACTATE AND CALCIUM CHLORIDE: 600; 310; 30; 20 INJECTION, SOLUTION INTRAVENOUS at 10:03

## 2019-08-12 RX ADMIN — DEXTROSE MONOHYDRATE 3 G: 50 INJECTION, SOLUTION INTRAVENOUS at 23:26

## 2019-08-12 RX ADMIN — Medication 0.2 MG: at 09:54

## 2019-08-12 RX ADMIN — PHENYLEPHRINE HYDROCHLORIDE 100 MCG: 10 INJECTION INTRAVENOUS at 09:54

## 2019-08-12 RX ADMIN — SODIUM CHLORIDE: 9 INJECTION, SOLUTION INTRAVENOUS at 09:01

## 2019-08-12 RX ADMIN — TRAMADOL HYDROCHLORIDE 50 MG: 50 TABLET, FILM COATED ORAL at 07:23

## 2019-08-12 RX ADMIN — PHENYLEPHRINE HYDROCHLORIDE 100 MCG: 10 INJECTION INTRAVENOUS at 10:09

## 2019-08-12 RX ADMIN — PHENYLEPHRINE HYDROCHLORIDE 25 MCG/MIN: 10 INJECTION INTRAVENOUS at 10:24

## 2019-08-12 RX ADMIN — PHENYLEPHRINE HYDROCHLORIDE 100 MCG: 10 INJECTION INTRAVENOUS at 10:00

## 2019-08-12 RX ADMIN — GABAPENTIN 300 MG: 300 CAPSULE ORAL at 21:17

## 2019-08-12 RX ADMIN — ACETAMINOPHEN 1000 MG: 500 TABLET ORAL at 13:51

## 2019-08-12 RX ADMIN — DEXTROSE MONOHYDRATE 3 G: 50 INJECTION, SOLUTION INTRAVENOUS at 16:24

## 2019-08-12 RX ADMIN — TRANEXAMIC ACID 1 G: 100 INJECTION, SOLUTION INTRAVENOUS at 09:39

## 2019-08-12 RX ADMIN — GABAPENTIN 800 MG: 400 CAPSULE ORAL at 07:23

## 2019-08-12 RX ADMIN — SODIUM CHLORIDE: 9 INJECTION, SOLUTION INTRAVENOUS at 07:12

## 2019-08-12 ASSESSMENT — PULMONARY FUNCTION TESTS
PIF_VALUE: 1
PIF_VALUE: 0
PIF_VALUE: 0
PIF_VALUE: 1
PIF_VALUE: 1
PIF_VALUE: 0
PIF_VALUE: 1
PIF_VALUE: 1
PIF_VALUE: 0
PIF_VALUE: 0
PIF_VALUE: 1
PIF_VALUE: 0
PIF_VALUE: 1
PIF_VALUE: 0
PIF_VALUE: 1
PIF_VALUE: 0
PIF_VALUE: 1
PIF_VALUE: 0
PIF_VALUE: 1
PIF_VALUE: 0
PIF_VALUE: 0
PIF_VALUE: 1
PIF_VALUE: 0
PIF_VALUE: 1
PIF_VALUE: 0
PIF_VALUE: 0
PIF_VALUE: 1
PIF_VALUE: 1
PIF_VALUE: 0
PIF_VALUE: 1
PIF_VALUE: 0
PIF_VALUE: 1
PIF_VALUE: 0
PIF_VALUE: 1
PIF_VALUE: 0
PIF_VALUE: 1
PIF_VALUE: 0
PIF_VALUE: 1
PIF_VALUE: 0
PIF_VALUE: 1
PIF_VALUE: 0
PIF_VALUE: 0
PIF_VALUE: 1

## 2019-08-12 ASSESSMENT — PAIN DESCRIPTION - PROGRESSION
CLINICAL_PROGRESSION: NOT CHANGED
CLINICAL_PROGRESSION: GRADUALLY WORSENING
CLINICAL_PROGRESSION: NOT CHANGED

## 2019-08-12 ASSESSMENT — LIFESTYLE VARIABLES: SMOKING_STATUS: 1

## 2019-08-12 ASSESSMENT — PAIN DESCRIPTION - ONSET
ONSET: ON-GOING
ONSET: ON-GOING
ONSET: AWAKENED FROM SLEEP
ONSET: AWAKENED FROM SLEEP

## 2019-08-12 ASSESSMENT — PAIN SCALES - GENERAL
PAINLEVEL_OUTOF10: 7
PAINLEVEL_OUTOF10: 7
PAINLEVEL_OUTOF10: 5
PAINLEVEL_OUTOF10: 5
PAINLEVEL_OUTOF10: 7
PAINLEVEL_OUTOF10: 6
PAINLEVEL_OUTOF10: 7
PAINLEVEL_OUTOF10: 7
PAINLEVEL_OUTOF10: 3
PAINLEVEL_OUTOF10: 7
PAINLEVEL_OUTOF10: 5
PAINLEVEL_OUTOF10: 4
PAINLEVEL_OUTOF10: 6

## 2019-08-12 ASSESSMENT — PAIN DESCRIPTION - ORIENTATION
ORIENTATION: LEFT

## 2019-08-12 ASSESSMENT — PAIN DESCRIPTION - DESCRIPTORS
DESCRIPTORS: ACHING
DESCRIPTORS: DULL;DISCOMFORT
DESCRIPTORS: ACHING
DESCRIPTORS: ACHING;DISCOMFORT
DESCRIPTORS: ACHING

## 2019-08-12 ASSESSMENT — ENCOUNTER SYMPTOMS
BLOOD IN STOOL: 0
PHOTOPHOBIA: 0
NAUSEA: 0
SHORTNESS OF BREATH: 0
ABDOMINAL DISTENTION: 0
VOMITING: 0
COUGH: 0
SHORTNESS OF BREATH: 0
WHEEZING: 0

## 2019-08-12 ASSESSMENT — PAIN DESCRIPTION - FREQUENCY
FREQUENCY: CONTINUOUS
FREQUENCY: CONTINUOUS
FREQUENCY: INTERMITTENT

## 2019-08-12 ASSESSMENT — PAIN DESCRIPTION - PAIN TYPE
TYPE: SURGICAL PAIN
TYPE: ACUTE PAIN;SURGICAL PAIN

## 2019-08-12 ASSESSMENT — PAIN - FUNCTIONAL ASSESSMENT
PAIN_FUNCTIONAL_ASSESSMENT: 0-10
PAIN_FUNCTIONAL_ASSESSMENT: PREVENTS OR INTERFERES SOME ACTIVE ACTIVITIES AND ADLS
PAIN_FUNCTIONAL_ASSESSMENT: ACTIVITIES ARE NOT PREVENTED
PAIN_FUNCTIONAL_ASSESSMENT: ACTIVITIES ARE NOT PREVENTED
PAIN_FUNCTIONAL_ASSESSMENT: PREVENTS OR INTERFERES SOME ACTIVE ACTIVITIES AND ADLS

## 2019-08-12 ASSESSMENT — PAIN DESCRIPTION - LOCATION
LOCATION: HIP

## 2019-08-12 NOTE — H&P
History and Physical     Pt Name: Aretha Gomez  MRN: [id number]  YOB: 1960  Date of evaluation: 7/29/2019  Primary Care Physician: Sanjana Azevedo  Patient evaluated at the request of  Dr. Florinda Hedrick  Reason for evaluation:  Left hip DJD   SUBJECTIVE:   History of Chief Complaint:   According to ortho note in Jun/2019     Colonel Mueller is a 66 y.o. male ,  who is being seen  for consultation and evaluation of left hip pain.    presents with a 2 years history of pain in the left and bilateral hip.  The pain does radiate into the thigh and into the groin.  The pain is   made worse with weightbearing.  The pain is worse at night.  The pain is worse when laying on the affected side.  The pain is made better with rest and ibuprofen. Patient has lost weight from 365lbs down to 275lbs but due to taking care of his wife recently he gained a few pounds. Patient said he will be under 40 by the time of surgery. Patient recently started metformin for his diabetes. He thinks is A1c is around 7.0.      Hx of left hip trouble that started 4 yrs ago which has gotten progressively worse.               Past Medical History       has a past medical history of Arthritis of left hip.     Past Surgical History   has no past surgical history on file.         Medications   Scheduled Meds:         Current Outpatient Rx   Medication Sig Dispense Refill    Misc. Devices MISC Shower Bench 1 Device 0    Misc. Devices MISC Rolling  Walker 1 Device 0    Misc. Devices MISC Bedside Commode 1 Device 0    aspirin (DULCE LOW DOSE) 81 MG chewable tablet Take 81 mg by mouth daily        ibuprofen (ADVIL;MOTRIN) 800 MG tablet Take 1 tablet by mouth as needed        quinapril-hydrochlorothiazide (ACCURETIC) 20-25 MG per tablet Take 1 tablet by mouth daily        sitaGLIPtan-metformin (JANUMET)  MG per tablet Take 1 tablet by mouth daily          Continuous Infusions:  PRN Meds:.   Allergies  has No Known

## 2019-08-12 NOTE — CONSULTS
[facility]  Internal Medicine Consultation      Rod Mckenzie  [id number]  8/12/2019  Reason for Consult:  Medical Management   Diabetes  Hypertension    Requesting Physician:  DO CHANO Cruz - Delphine Wiggins    The patient has been examined and the chart was reviewed. CHIEF COMPLAINT:  Hip pain    History Obtained From:  patient, electronic medical record    HISTORY OF PRESENT ILLNESS:   Location: Left hip pain  Severity:  Moderate to severe    Duration:  Chronic   Timing:  Progressive   Context:  Known OA  Modifiers:  Has not responded to conservative treatment  Associations:  Pain, stiffness, decreased function     The patient has not responded to conservative treatment  He has elected surgery  He is now status post:  Procedure(s):  HIP TOTAL ARTHROPLASTY- LEFT  INTRA-OP FLUOROSCOPY     X-ray has revealed:  Impression:   1. Left hip arthroplasty with no immediate complications.      Blood sugars have been somewhat elevated:  Glucose 139High     Lab work from 7/29/2019 has included:   Glucose 116  Potassium 3.6  White blood cell count 7.6  Hemoglobin 14.1    Past Medical History:    Past Medical History:   Diagnosis Date    Arthritis of left hip 06/2019    Basal cell carcinoma     face & neck    Essential hypertension     H/O Bell's palsy 2018    Type 2 diabetes mellitus without complication ([facility] [address]     Wears glasses        Past Surgical History:    Past Surgical History:   Procedure Laterality Date    COLONOSCOPY  02/20/2017    3 Polypectomey    MOHS SURGERY  [id number], 11/2/16       Current Medications:    Current Facility-Administered Medications: tranexamic acid (CYKLOKAPRON) 1000 MG/10ML injection, , ,   aspirin chewable tablet 81 mg, 81 mg, Oral, Daily  quinapril-hydrochlorothiazide (ACCURETIC) 20-25 MG per tablet 1 tablet, 1 tablet, Oral, Daily  acetaminophen (TYLENOL) tablet 1,000 mg, 1,000 mg, Oral, Q6H  gabapentin (NEURONTIN) capsule 300 mg, 300 mg, Oral, Nightly  oxyCODONE (ROXICODONE) immediate release tablet 10 mg, 10 mg, Oral, Q4H PRN  oxyCODONE (ROXICODONE) immediate release tablet 5 mg, 5 mg, Oral, Q4H PRN  ketorolac (TORADOL) injection 30 mg, 30 mg, Intravenous, Q6H  0.9 % sodium chloride infusion, , Intravenous, Continuous  sodium chloride flush 0.9 % injection 10 mL, 10 mL, Intravenous, 2 times per day  sodium chloride flush 0.9 % injection 10 mL, 10 mL, Intravenous, PRN  docusate sodium (COLACE) capsule 100 mg, 100 mg, Oral, BID  ondansetron (ZOFRAN) injection 4 mg, 4 mg, Intravenous, Q6H PRN  ceFAZolin (ANCEF) 3 g in dextrose 5 % 100 mL IVPB, 3 g, Intravenous, Q8H  magnesium hydroxide (MILK OF MAGNESIA) 400 MG/5ML suspension 30 mL, 30 mL, Oral, Daily PRN  polyethylene glycol (GLYCOLAX) packet 17 g, 17 g, Oral, Daily PRN  [START ON 8/13/2019] apixaban (ELIQUIS) tablet 2.5 mg, 2.5 mg, Oral, BID  sitaGLIPtan-metformin (JANUMET)  MG per tablet 1 tablet, 1 tablet, Oral, BID WC  insulin lispro (HUMALOG) injection vial 0-6 Units, 0-6 Units, Subcutaneous, TID WC  insulin lispro (HUMALOG) injection vial 0-3 Units, 0-3 Units, Subcutaneous, Nightly  glucose (GLUTOSE) 40 % oral gel 15 g, 15 g, Oral, PRN  dextrose 50 % IV solution, 12.5 g, Intravenous, PRN  glucagon (rDNA) injection 1 mg, 1 mg, Intramuscular, PRN  dextrose 5 % solution, 100 mL/hr, Intravenous, PRN    Allergies:  Patient has no known allergies. Social History:    TOBACCO:   reports that he has been smoking cigars. He has never used smokeless tobacco.  ETOH:   reports that he drinks alcohol.     FamilyHistory:       Problem Relation Age of Onset    Other Mother         Hip replacement, melanoma    COPD Father     Heart Attack Father 54    Diabetes type 2  Sister     Other Sister         pre-skin cancer    Kidney Disease Brother         nephrotic syndrome    Dementia Maternal Grandmother     Parkinsonism Maternal Grandfather     Arthritis Paternal Grandmother     Deep Vein Thrombosis and time. Skin: Skin is warm and dry. He is not diaphoretic. Wound is dressed, dry and clean    Vitals reviewed.       DATA:    Recent Results (from the past 24 hour(s))   POC Glucose Fingerstick    Collection Time: 08/12/19  7:19 AM   Result Value Ref Range    POC Glucose 171 (H) 75 - 110 mg/dL   Potassium    Collection Time: 08/12/19  7:28 AM   Result Value Ref Range    Potassium 4.1 3.7 - 5.3 mmol/L   POC Glucose Fingerstick    Collection Time: 08/12/19 12:24 PM   Result Value Ref Range    POC Glucose 163 (H) 75 - 110 mg/dL   POC Glucose Fingerstick    Collection Time: 08/12/19  2:03 PM   Result Value Ref Range    POC Glucose 139 (H) 75 - 110 mg/dL       Findings:   Arthritis of left hip  Active Hospital Problems    Diagnosis Date Noted    History of total left hip arthroplasty - 8/12/19 [Z96.642] 08/12/2019    Hypokalemia [E87.6] 08/12/2019    Essential hypertension [I10]     Type 2 diabetes mellitus without complication ([facility] [address] [I78.1]     Status post total replacement of left hip [Z96.642]     Arthritis of left hip [M16.12] 06/01/2019       Recommendations:  Orthopedic evaluation  Physical therapy and rehabilitation   DVT prophylaxis  Pain management  Glycemic contol - monitor and control blood sugars  Blood Pressure - Monitor and control  Correct electrolyte abnormalities - potassium supplementation as needed     See orders  Will follow    Electronically signed by Kelely Sanders DO on 8/12/2019 at 5:45 PM  Copy of H&P to Lissa Rider

## 2019-08-12 NOTE — PROGRESS NOTES
Report called to PCU. Spoke with Caroline Denson. Patient taking po without issue. No nausea. Taking oral pain medication without issue. negative

## 2019-08-12 NOTE — PROGRESS NOTES
PROTOCOLS  NURSING IMPLEMENTED    TOTAL JOINT DVT/PE  VENOUS THROMBOEMBOLISM PROPHYLAXIS  (Nursing Automatically Implement)    Yocasta Escamilla  [id number]  [unfilled]  8/11/19    YES DVT RISK FACTOR SCORE YES MAJOR BLEEDING RISK FACTORS SCORE     [x] 48years old or greater (1)   [] Hx. Easy Bleeding (1)      [] Heart failure (2)   [] NSAID Use in Last 5 Days (2)      [] Varicose veins - Hx. (1)   [] Gastrointestinal or Genitourinary bleeding in Last 14 Days (2)      [] Myocardial Infarction - Hx. (1)         [] Cancer - Hx. (2)         [] Atrial fibrillation - Hx. (1)         [] Ischemic Stroke - Hx. (1)         [x] Diabetes Mellitus - Hx. (1)         [] Previous DVT/PE - Hx.  (2)         [] Hormone Replacement Therapy (1)         [x] Obesity (1)         [] Paralysis (1)         [] Pregnancy (1)         [x] Smoking (1)                   [] Thromophilia (1)   []   Mild to Moderate Bleeding (2)      [] Total Hip Arthroplasty (1)   [] Active Bleeding (4)      [] Family history of PE or DVT? (4) (Consider the following labs to test for presence of inhibitor deficiency state:) Factor V Leiden, Prothrombin Gene Mutation, Protein S Deficiency, Protien C Deficiency, Antithrombin Deficiency   [] Malignant Hypertension (2)        [] Thrombocytopenia 20k to 100k (2)        [] Thrombocytopenia less than 20k (4)        [] Bleeding Diathesis (4)        [] \"Bloody Stick\" Epidural or Spinal (2)     TOTAL DVT SCORE   TOTAL BLEEDING SCORE      [] CLASS A   Standard Risk DVT (0-3)    [x] CLASS X Standard Risk Bleeding (0-4)      [x] CLASS B Elevated Risk DVT (greater than 3)    [] CLASS Y High Risk Bleeding (greater than 4)     FINAL MATRIX (e.g. AY)       *If allergic to ASA use Warfarin  *BY patient consider no treatment  **Consider venous filter with high risk PE  **If on Coumadin pre-op, then restart night of surgery      []  DVT Prophylaxis: Class AX, AY    Ecotrin 81 mg by mouth BID starting day of surgery for 6 weeks for all total joints. (If allergic to aspirin, give eliquis 2.5mg BID X 14 days (knees) or 35 days  (hips) starting first day postop at 0600). [x]  DVT Prophylaxis:  Class BX (rick choice)    [x]Eliquis 2.5mg BID x 14 days (knees) or 35 days (hips) starting first day postop      at 0600      [] Lovenox 40 mg subcu daily starting first day postop at 0600 x 14 days                             (knees) or 35 days (hips)    Ecotrin 81 mg PO BID-start when Lovenox is finished. (Teach injection prior to discharge.)       [] Xarelto 10 mg by mouth daily starting first day postop at 0600     14 days (knees) or 35 days (hips). If creatinine clearance less than 30 mL/min, give Lovenox 30 mg subcu   Daily starting first day postop at 0600. Ecotrin 81 mg PO BID-starting   when Lovenox is finished. (Teach injection prior to      discharge.)  (For discharge fill throughout the 10 mg daily with no    refills.)      []  DVT Prophylaxis:  Class BY (rick choice)               []  Eliquis 2.5mg BID x 14 days (knees) or 35 days (hips) starting first day                              postop at 0600        []  Ecotrin 81 mg PO BID x 6weeks (all joints)      []  Lovenox 40mg SQ daily to start at 0600 first day post-Op day. 14 days for knees, 35 days for hips    Ecotrin 81 mg PO BID - start when Lovenox is finished. (Teach injection prior to discharge.)       [] Xarelto 10 mg PO daily starting first day Post-Op at 0600    14 days (knees) or 35 days (hips)    If Creatinine Clearance less than 30ml/min, give Lovenox 30 mg    SQ daily starting first day Post-Op at 0600 x 14 days (knees) or 35   days (hips). Ecotrin 81 mg PO BID - start when Lovenox is finished.     (Teach injection prior to discharge.)  (For discharge fill throughout the   10 mg daily #32 with no refills.)      Electronically signed by Amrit Henson DO on 8/11/2019 at 9:51 PM

## 2019-08-12 NOTE — ANESTHESIA PRE PROCEDURE
history of anesthetic complications:   Airway: Mallampati: II  TM distance: >3 FB   Neck ROM: full  Mouth opening: > = 3 FB Dental: normal exam         Pulmonary:normal exam  breath sounds clear to auscultation  (+) current smoker    (-) pneumonia, COPD, asthma, shortness of breath, recent URI and sleep apnea          Patient did not smoke on day of surgery. Cardiovascular:    (+) hypertension: no interval change,         Rhythm: regular  Rate: normal                    Neuro/Psych:   Negative Neuro/Psych ROS               ROS comment: H/O Bell's palsy GI/Hepatic/Renal: Neg GI/Hepatic/Renal ROS            Endo/Other:    (+) DiabetesType II DM, no interval change, , : arthritis: OA., malignancy/cancer. ROS comment: Basal cell carcinoma Abdominal:           Vascular: negative vascular ROS. Anesthesia Plan      spinal     ASA 2     (Patient prefers to have spinal and fascia iliaca block, may need GA)        Anesthetic plan and risks discussed with patient. Plan discussed with CRNA.                   Rita Johnson MD   8/12/2019

## 2019-08-12 NOTE — PROGRESS NOTES
Patient in room; Pt arrived to floor via bed  from surgery. Vitals taken. No distress noted. POC and education initiated and reviewed with patient. Call light within reach, and pt educated on its use. Bed in lowest position, and locked. Side rails up x 2. Denied further questions or needs at this time. Will continue to monitor. Abductor pillow applied and EPC cuff on per order.

## 2019-08-13 VITALS
WEIGHT: 302.25 LBS | OXYGEN SATURATION: 99 % | DIASTOLIC BLOOD PRESSURE: 56 MMHG | TEMPERATURE: 97.6 F | HEIGHT: 72 IN | BODY MASS INDEX: 40.94 KG/M2 | SYSTOLIC BLOOD PRESSURE: 106 MMHG | RESPIRATION RATE: 16 BRPM | HEART RATE: 79 BPM

## 2019-08-13 LAB
ABSOLUTE EOS #: 0.2 K/UL (ref 0–0.4)
ABSOLUTE IMMATURE GRANULOCYTE: ABNORMAL K/UL (ref 0–0.3)
ABSOLUTE LYMPH #: 1.8 K/UL (ref 1–4.8)
ABSOLUTE MONO #: 0.8 K/UL (ref 0.1–1.2)
BASOPHILS # BLD: 1 % (ref 0–2)
BASOPHILS ABSOLUTE: 0 K/UL (ref 0–0.2)
DIFFERENTIAL TYPE: ABNORMAL
EOSINOPHILS RELATIVE PERCENT: 3 % (ref 1–4)
GLUCOSE BLD-MCNC: 108 MG/DL (ref 75–110)
GLUCOSE BLD-MCNC: 152 MG/DL (ref 75–110)
HCT VFR BLD CALC: 36.7 % (ref 41–53)
HEMOGLOBIN: 13.1 G/DL (ref 13.5–17.5)
IMMATURE GRANULOCYTES: ABNORMAL %
LYMPHOCYTES # BLD: 20 % (ref 24–44)
MCH RBC QN AUTO: 30.5 PG (ref 26–34)
MCHC RBC AUTO-ENTMCNC: 35.6 G/DL (ref 31–37)
MCV RBC AUTO: 85.7 FL (ref 80–100)
MONOCYTES # BLD: 9 % (ref 2–11)
NRBC AUTOMATED: ABNORMAL PER 100 WBC
PDW BLD-RTO: 13.9 % (ref 12.5–15.4)
PLATELET # BLD: 207 K/UL (ref 140–450)
PLATELET ESTIMATE: ABNORMAL
PMV BLD AUTO: 7.8 FL (ref 6–12)
RBC # BLD: 4.28 M/UL (ref 4.5–5.9)
RBC # BLD: ABNORMAL 10*6/UL
SEG NEUTROPHILS: 67 % (ref 36–66)
SEGMENTED NEUTROPHILS ABSOLUTE COUNT: 6.1 K/UL (ref 1.8–7.7)
WBC # BLD: 9 K/UL (ref 3.5–11)
WBC # BLD: ABNORMAL 10*3/UL

## 2019-08-13 PROCEDURE — 82947 ASSAY GLUCOSE BLOOD QUANT: CPT

## 2019-08-13 PROCEDURE — 6370000000 HC RX 637 (ALT 250 FOR IP): Performed by: STUDENT IN AN ORGANIZED HEALTH CARE EDUCATION/TRAINING PROGRAM

## 2019-08-13 PROCEDURE — 97535 SELF CARE MNGMENT TRAINING: CPT

## 2019-08-13 PROCEDURE — 97530 THERAPEUTIC ACTIVITIES: CPT

## 2019-08-13 PROCEDURE — 97116 GAIT TRAINING THERAPY: CPT

## 2019-08-13 PROCEDURE — 6360000002 HC RX W HCPCS: Performed by: STUDENT IN AN ORGANIZED HEALTH CARE EDUCATION/TRAINING PROGRAM

## 2019-08-13 PROCEDURE — 85025 COMPLETE CBC W/AUTO DIFF WBC: CPT

## 2019-08-13 PROCEDURE — 36415 COLL VENOUS BLD VENIPUNCTURE: CPT

## 2019-08-13 PROCEDURE — 99232 SBSQ HOSP IP/OBS MODERATE 35: CPT | Performed by: INTERNAL MEDICINE

## 2019-08-13 PROCEDURE — 97110 THERAPEUTIC EXERCISES: CPT

## 2019-08-13 RX ADMIN — OXYCODONE HYDROCHLORIDE 10 MG: 5 TABLET ORAL at 03:53

## 2019-08-13 RX ADMIN — ACETAMINOPHEN 1000 MG: 500 TABLET ORAL at 08:16

## 2019-08-13 RX ADMIN — APIXABAN 2.5 MG: 2.5 TABLET, FILM COATED ORAL at 08:03

## 2019-08-13 RX ADMIN — KETOROLAC TROMETHAMINE 30 MG: 30 INJECTION, SOLUTION INTRAMUSCULAR; INTRAVENOUS at 14:30

## 2019-08-13 RX ADMIN — DOCUSATE SODIUM 100 MG: 100 CAPSULE, LIQUID FILLED ORAL at 08:02

## 2019-08-13 RX ADMIN — OXYCODONE HYDROCHLORIDE 10 MG: 5 TABLET ORAL at 16:18

## 2019-08-13 RX ADMIN — OXYCODONE HYDROCHLORIDE 10 MG: 5 TABLET ORAL at 08:03

## 2019-08-13 RX ADMIN — KETOROLAC TROMETHAMINE 30 MG: 30 INJECTION, SOLUTION INTRAMUSCULAR; INTRAVENOUS at 02:04

## 2019-08-13 RX ADMIN — ACETAMINOPHEN 1000 MG: 500 TABLET ORAL at 02:03

## 2019-08-13 RX ADMIN — OXYCODONE HYDROCHLORIDE 10 MG: 5 TABLET ORAL at 12:15

## 2019-08-13 RX ADMIN — KETOROLAC TROMETHAMINE 30 MG: 30 INJECTION, SOLUTION INTRAMUSCULAR; INTRAVENOUS at 08:02

## 2019-08-13 RX ADMIN — ACETAMINOPHEN 1000 MG: 500 TABLET ORAL at 14:30

## 2019-08-13 ASSESSMENT — ENCOUNTER SYMPTOMS
SHORTNESS OF BREATH: 0
ABDOMINAL PAIN: 0
NAUSEA: 0
COUGH: 0
VOMITING: 0

## 2019-08-13 ASSESSMENT — PAIN DESCRIPTION - ONSET
ONSET: ON-GOING
ONSET: ON-GOING

## 2019-08-13 ASSESSMENT — PAIN DESCRIPTION - ORIENTATION
ORIENTATION: LEFT

## 2019-08-13 ASSESSMENT — PAIN DESCRIPTION - LOCATION
LOCATION: HIP

## 2019-08-13 ASSESSMENT — PAIN SCALES - GENERAL
PAINLEVEL_OUTOF10: 6
PAINLEVEL_OUTOF10: 4
PAINLEVEL_OUTOF10: 7
PAINLEVEL_OUTOF10: 7
PAINLEVEL_OUTOF10: 5
PAINLEVEL_OUTOF10: 7
PAINLEVEL_OUTOF10: 4
PAINLEVEL_OUTOF10: 2
PAINLEVEL_OUTOF10: 6
PAINLEVEL_OUTOF10: 7

## 2019-08-13 ASSESSMENT — PAIN DESCRIPTION - PAIN TYPE
TYPE: SURGICAL PAIN

## 2019-08-13 ASSESSMENT — PAIN - FUNCTIONAL ASSESSMENT
PAIN_FUNCTIONAL_ASSESSMENT: PREVENTS OR INTERFERES SOME ACTIVE ACTIVITIES AND ADLS
PAIN_FUNCTIONAL_ASSESSMENT: PREVENTS OR INTERFERES SOME ACTIVE ACTIVITIES AND ADLS

## 2019-08-13 ASSESSMENT — PAIN DESCRIPTION - FREQUENCY
FREQUENCY: INTERMITTENT
FREQUENCY: INTERMITTENT

## 2019-08-13 ASSESSMENT — PAIN DESCRIPTION - PROGRESSION
CLINICAL_PROGRESSION: NOT CHANGED
CLINICAL_PROGRESSION: NOT CHANGED

## 2019-08-13 ASSESSMENT — PAIN DESCRIPTION - DESCRIPTORS
DESCRIPTORS: ACHING;DISCOMFORT;SORE;DULL
DESCRIPTORS: DISCOMFORT;DULL

## 2019-08-13 NOTE — PROGRESS NOTES
on \"up with the good/down with the bad\"     Balance  Posture: Good  Sitting - Static: Good  Sitting - Dynamic: Good  Standing - Static: Good  Standing - Dynamic: Good  Comments: standing balance assessed with RW  Exercises  Hamstring Sets: 15x supine L LE  Quad Sets: 15x supine L LE  Heelslides: 15x supine AAROM L LE  Gluteal Sets: 15x  Hip Abduction: 15x supine PROM-AAROM L LE   Knee Long Arc Quad: 15x seated L LE   Ankle Pumps: 15x B LE                       Goals  Short term goals  Time Frame for Short term goals: 14 visits  Short term goal 1: Pt to ascend/descend 2 steps no railing independent to assist with return to prior living arrangement. Short term goal 2: Pt to ambulate 300' RW modified independent to assist with return to prior level of function. Short term goal 3: Pt to complete bed mobility independent to assist with return to prior level of function. Short term goal 4: Pt to complete sit<>stand transfer independent to assist with return to prior level of function. Short term goal 5: Pt to exhibit knowledge of supine FATIMAH HEP to assist with mobility at discharge. Patient Goals   Patient goals : to return home    Plan    Plan  Times per week: 7x  Times per day: Twice a day  Plan weeks: 14 visits  Current Treatment Recommendations: Transfer Training, Endurance Training, ADL/Self-care Training, Gait Training, Home Exercise Program, Functional Mobility Training, Stair training, Safety Education & Training  Safety Devices  Type of devices:  All fall risk precautions in place, Gait belt, Nurse notified, Call light within reach, Chair alarm in place, Left in chair  Restraints  Initially in place: No     Therapy Time   Individual Concurrent Group Co-treatment   Time In 0937         Time Out 1005         Minutes 28         Timed Code Treatment Minutes: Eldon Pretty Gulliver 81, SPT  Evaluation/treatment performed by Student PT under the supervision of co-signing PT who agrees with all

## 2019-08-13 NOTE — PROGRESS NOTES
Subjective   General  Chart Reviewed: Orders, Progress Notes, History and Physical, Imaging, Labs  Patient assessed for rehabilitation services?: Yes  Response to previous treatment: Patient with no complaints from previous session  Family / Caregiver Present: Yes(Pt's wife present throughout )  General Comment  Comments: RN ok'd for session this PM. Pt cooperative and agreeable to participate throughout    Pt Currently in Pain: Denies        Orientation  Orientation  Overall Orientation Status: Within Functional Limits     Objective    ADL  Grooming: Contact guard assistance  LE Dressing: Minimal assistance; Increased time to complete(donning shoes )  Toileting: Contact guard assistance(CGA for toilet transfer w/use of RW, pt demo ability to complete toilet hygiene and clothing management supervision-SBA )           Balance  Sitting Balance: Independent  Standing Balance: Contact guard assistance  Standing Balance  Time: ~9 minutes total   Activity: Functional mobility to and from bathroom and static standing to complete transfer training   Functional Mobility  Functional - Mobility Device: Rolling Walker  Activity: To/from bathroom; Other  Assist Level: Contact guard assistance  Functional Mobility Comments: Pt mildly unsteady with no LOB, reporting some numbness in LLE   Toilet Transfers  Toilet - Technique: Ambulating  Equipment Used: Grab bars  Toilet Transfer: Stand by assistance  Toilet Transfers Comments: Pt w/use of RW and 2 grab bars to simulate home set up with toilet raiser with rails   Bed mobility  Scooting: Stand by assistance(From recliner chair, pt performing independently, SBA provided for safety )  Comment: Pt up to recliner chair at writer's exit and entry   Transfers  Sit to stand: Contact guard assistance  Stand to sit: Contact guard assistance  Transfer Comments: Pt with use of RW and min verbal cues for appropriate hand placement/safety      Cognition  Overall Cognitive Status: [facility]     Plan

## 2019-08-13 NOTE — PROGRESS NOTES
Progress Note    Patient:  Sherryle Gunnels  YOB: 1960     66 y.o. male    Subjective:  Patient seen and examined  No complaints or concerns are noted by the patient. He is doing well. No issue overnight  Pain controlled with current pain medication regimen. Denies fever, HA, CP, SOB, N/V, dysuria  BM/flatus  PT is ongoing. The patient states he was up with physical therapy and doing 50 feet yesterday and then they asked him to rest in advance of anticipation of therapy today. Objective:   Vitals:    08/12/19 2330   BP: 108/70   Pulse: 57   Resp: 16   Temp: 98.1 °F (36.7 °C)   SpO2:      Gen: NAD, cooperative   MSK: Left hip dressings are intact without strikethrough. Motor, sensory, vascular examination of the left lower extremity is grossly intact without focal deficits. Mild left thigh swelling is noted. Recent Labs     08/12/19  0728   K 4.1      Meds: Per chart  See rec for complete list    Impression/plan: 66 y.o. male s/p anterior intermuscular supine total hip arthroplasty, POD#1    -Patient is doing well. We will see how he does with physical therapy today. More than likely the patient will be discharged home today with outpatient physical therapy and follow-up in 10 to 14 days. -WB status weightbearing as tolerated to the left lower extremity. Anterior hip precautions.  -Pain control per current medication regiment.   -DVT ppx: Per risk stratification tool on the chart.  -Ice/Elevate  -Incentive Spirometry use  -PT/OT  -Please page DO ortho with any questions    Salma Rangel  5:24 AM 8/13/2019

## 2019-08-13 NOTE — CARE COORDINATION
Joint Replacement Navigator Daily Rounding Note    Admission Date:   8/12/2019 Pablo Hernandez is a 66 y.o.  male    Post Op Day # 1    Labs:         Recent Labs     08/13/19  0616   WBC 9.0   HGB 13.1*          Recent Labs     08/12/19  0728   K 4.1         Met with:     Patient and Family  Patient's LOC:   alert and oriented X3  Patient progress   great  Patient's Pain:   Patient rates pain  Managed with meds  Oral Intake:    good  Output:    adequate   Bryant in:   no  infublock   no  Patient - ready for self care yes  Willing    Yes   Able    Yes with the following limitation  None   Family or others are Ready for promoting self care  Yes   Willing    yes  Able    Yes    Patient goals   Home and wants to be well for a [employer] game    Considerations for care plan   Very motivated, great supportive spouse who is pt  Patients response to lifestyle changes  Accepting   Additional educational needs are  None, reviewed self care, patient has good knowledge base   Barriers to self care plan   None   Plan for next level of care:  hoem and hep    Walker/DME:    DME needed:    None    DME Agency:         Anticoagulation:  Anticoagulation:  eliauis  Prescription in chart:  yes         Discharge Planning:  Confirmed with patient that discharge plan is Home. Agency/Facility chosen:  hep  Awaiting pre-certification no  Anticipated discharge date: today    Self care instructions were reviewed with patient. Patient's questions and concerns were answered. Actively listened and provided reassurance.      Electronically signed by: Hema Aguillon RN on 8/13/2019 at 9:46 AM

## 2019-08-13 NOTE — CARE COORDINATION
Case Management Initial Discharge Plan  Bill Durant,             Met with:patient to discuss discharge plans. Information verified: address, contacts, phone number, , insurance Yes  PCP: Kylie Singletary  Date of last visit: 19    Insurance Provider: Medical [employer]    Discharge Planning    Living Arrangements:  Spouse/Significant Other   Support Systems:  Spouse/Significant Other    Home has 1 stories  1 stairs to climb to get into front door, 15stairs to climb to reach second floor  Location of bedroom/bathroom in home main     Patient able to perform ADL's:Independent    Current Services (outpatient & in home) none   DME equipment: walker   DME provider: [employer]     Pharmacy: Meds to [facility] Medications:  No  Does patient want to participate in local refill/ meds to beds program?  Yes    Potential Assistance Needed:  Outpatient PT/OT    Patient agreeable to home care: No  [place] of choice provided:  n/a    Prior SNF/Rehab Placement and Facility: no   Agreeable to SNF/Rehab: No  [place] of choice provided: n/a   Evaluation: n/a    Expected Discharge date:  19  Patient expects to be discharged to:  Home   Follow Up Appointment: Best Day/ Time: Thursday AM    Transportation provider: wife   Transportation arrangements needed for discharge: No    Readmission Risk              Risk of Unplanned Readmission:        8             Does patient have a readmission risk score greater than 14?: No  If yes, follow-up appointment must be made within 7 days of discharge.      Discharge Plan: return home , outpt sanju , first appointment is with Lb Dolan [place]@"[employer]           Electronically signed by Caroline Fong RN on 19 at 8:59 AM

## 2019-08-13 NOTE — PROGRESS NOTES
General  Chart Reviewed: Yes, Progress Notes  Patient assessed for rehabilitation services?: Yes  Response to previous treatment: Patient with no complaints from previous session  Family / Caregiver Present: No  General Comment  Comments: RN ok'd OT second session. Patient pleasant and agreeable for participation   Pain Assessment  Pain Assessment: 0-10  Pain Level: 7  Pain Type: Surgical pain  Pain Location: Hip  Pain Orientation: Left  Vital Signs  Patient Currently in Pain: Yes   Orientation  Orientation  Overall Orientation Status: Within Normal Limits  Objective    ADL  Equipment Provided: Sock aid(Provided education about use of EZ slide forProvided education about wide-sock aid for increased independence with LB dressing tasks. Patient interested in wide sock aid and ordered during session.  Patient verbalized understanding of education provided. )                                      Cognition  Overall Cognitive Status: WNL                                         Plan   Plan  Times per week: 6-7x   Times per day: Twice a day  Current Treatment Recommendations: Balance Training, Self-Care / ADL, Safety Education & Training, Functional Mobility Training, Equipment Evaluation, Education, & procurement, Home Management Training, Strengthening    Goals  Short term goals  Time Frame for Short term goals: 14 visits   Short term goal 1: Pt will complete lower extremity dressing and bathing with mod IND using least restrictive assistive device/DME prn in 5/5 of trials   Short term goal 2: Pt will complete all toileting tasks (transfer, hygiene, clothing management) with mod IND using least restrictive assistive device/DME prn in 5/5 trials   Short term goal 3: Pt will complete functional transfers and mobility with mod IND using least restrictive assistive device/DME prn in 5/5 trials   Short term goal 4: Pt will independently verbalize/demonstrate understanding of at least 3 fall prevention strategies in 5/5 trials

## 2019-08-20 DIAGNOSIS — M16.12 ARTHRITIS OF LEFT HIP: Primary | ICD-10-CM

## 2019-08-23 ENCOUNTER — OFFICE VISIT (OUTPATIENT)
Dept: ORTHOPEDIC SURGERY | Age: 59
End: 2019-08-23

## 2019-08-23 VITALS — BODY MASS INDEX: 40.91 KG/M2 | HEIGHT: 72 IN | WEIGHT: 302.03 LBS

## 2019-08-23 DIAGNOSIS — M16.12 ARTHRITIS OF LEFT HIP: Primary | ICD-10-CM

## 2019-08-23 DIAGNOSIS — Z96.642 H/O TOTAL HIP ARTHROPLASTY, LEFT: ICD-10-CM

## 2019-08-23 PROCEDURE — 99024 POSTOP FOLLOW-UP VISIT: CPT | Performed by: ORTHOPAEDIC SURGERY

## 2019-08-23 ASSESSMENT — ENCOUNTER SYMPTOMS
COUGH: 0
CONSTIPATION: 0
NAUSEA: 0
DIARRHEA: 0

## 2019-08-23 NOTE — PROGRESS NOTES
Date: 8/12/2019  Radiology exam is complete. No Radiologist dictation. Please follow up with ordering provider. Xr Hip 2-3 Vw W Pelvis Left    Result Date: 8/12/2019  EXAMINATION: ONE XRAY VIEW OF THE PELVIS AND TWO XRAY VIEWS LEFT HIP 8/12/2019 12:47 pm COMPARISON: Radiographs of the left hip 06/07/2019. HISTORY: ORDERING SYSTEM PROVIDED HISTORY: post op TECHNOLOGIST PROVIDED HISTORY: Low AP pelvis, AP and cross-table lateral of hip please, thank you PACU please Reason for exam:->post op Reason for Exam: post op left hip replacement Acuity: Acute Type of Exam: Initial FINDINGS: There are expected postoperative surgical changes of a total left hip replacement including soft tissue swelling, soft tissue gas, joint effusion and foci of gas within the joint space. There is no acute fracture or dislocation. Redemonstration of moderate degenerative changes of the right hip were noted on the frontal view of the pelvis. Expected postoperative surgical changes of a total left hip replacement, as described above. Assessment:      1. Arthritis of left hip    2. H/O total hip arthroplasty, left         Plan:      Patient to continue physical therapy. Patient okay to drive. Patient to avoid soaking the incision. Handicap placard ordered. Finish out eliquis and transition to aspirin 81mg. Follow up in 4 weeks. Follow up: Return in about 4 weeks (around 9/20/2019) for re- evaluation. Orders Placed This Encounter   Medications    Handicap Placard [facility]     Sig: by Does not apply route Duration 6 months starting 08/23/2019. Dispense:  1 each     Refill:  0       No orders of the defined types were placed in this encounter. Breann Johnson RN am scribing for and in the presence of Dr. Kiran Brown  8/26/2019 10:41 AM      I have reviewed and made changes accordingly to the work scribed by Meseret Simmons RN. The documentation accurately reflects work and decisions made by me.   I have

## 2019-09-19 ENCOUNTER — OFFICE VISIT (OUTPATIENT)
Dept: ORTHOPEDIC SURGERY | Age: 59
End: 2019-09-19

## 2019-09-19 VITALS — WEIGHT: 302.03 LBS | BODY MASS INDEX: 40.91 KG/M2 | HEIGHT: 72 IN

## 2019-09-19 DIAGNOSIS — Z96.642 H/O TOTAL HIP ARTHROPLASTY, LEFT: ICD-10-CM

## 2019-09-19 DIAGNOSIS — M16.12 ARTHRITIS OF LEFT HIP: Primary | ICD-10-CM

## 2019-09-19 PROCEDURE — 99024 POSTOP FOLLOW-UP VISIT: CPT | Performed by: ORTHOPAEDIC SURGERY

## 2019-09-19 ASSESSMENT — ENCOUNTER SYMPTOMS
WHEEZING: 0
BACK PAIN: 0
SHORTNESS OF BREATH: 0

## 2021-04-23 ENCOUNTER — TELEPHONE (OUTPATIENT)
Dept: ORTHOPEDIC SURGERY | Age: 61
End: 2021-04-23

## 2021-04-23 DIAGNOSIS — M25.562 LEFT KNEE PAIN, UNSPECIFIED CHRONICITY: Primary | ICD-10-CM

## 2021-04-26 ENCOUNTER — OFFICE VISIT (OUTPATIENT)
Dept: ORTHOPEDIC SURGERY | Age: 61
End: 2021-04-26
Payer: COMMERCIAL

## 2021-04-26 VITALS — BODY MASS INDEX: 39.44 KG/M2 | WEIGHT: 297.6 LBS | HEIGHT: 73 IN

## 2021-04-26 DIAGNOSIS — M17.11 ARTHRITIS OF RIGHT KNEE: Primary | ICD-10-CM

## 2021-04-26 DIAGNOSIS — S83.241A OTHER TEAR OF MEDIAL MENISCUS, CURRENT INJURY, RIGHT KNEE, INITIAL ENCOUNTER: ICD-10-CM

## 2021-04-26 PROCEDURE — 99214 OFFICE O/P EST MOD 30 MIN: CPT | Performed by: ORTHOPAEDIC SURGERY

## 2021-04-26 PROCEDURE — 20610 DRAIN/INJ JOINT/BURSA W/O US: CPT | Performed by: ORTHOPAEDIC SURGERY

## 2021-04-26 RX ORDER — BUPIVACAINE HYDROCHLORIDE 2.5 MG/ML
2 INJECTION, SOLUTION INFILTRATION; PERINEURAL ONCE
Status: COMPLETED | OUTPATIENT
Start: 2021-04-26 | End: 2021-04-26

## 2021-04-26 RX ORDER — METHYLPREDNISOLONE ACETATE 80 MG/ML
80 INJECTION, SUSPENSION INTRA-ARTICULAR; INTRALESIONAL; INTRAMUSCULAR; SOFT TISSUE ONCE
Status: COMPLETED | OUTPATIENT
Start: 2021-04-26 | End: 2021-04-26

## 2021-04-26 RX ADMIN — BUPIVACAINE HYDROCHLORIDE 5 MG: 2.5 INJECTION, SOLUTION INFILTRATION; PERINEURAL at 08:37

## 2021-04-26 RX ADMIN — METHYLPREDNISOLONE ACETATE 80 MG: 80 INJECTION, SUSPENSION INTRA-ARTICULAR; INTRALESIONAL; INTRAMUSCULAR; SOFT TISSUE at 08:38

## 2021-04-26 ASSESSMENT — ENCOUNTER SYMPTOMS
CHEST TIGHTNESS: 0
ABDOMINAL PAIN: 0
APNEA: 0
VOMITING: 0
COUGH: 0

## 2021-04-26 NOTE — PROGRESS NOTES
[facility]  [address]  Dept: [phone]  Dept Fax: [phone]        Ambulatory Follow Up / new problem      Subjective:   Mirian Hilario is a 64y.o. year old male who presents to our office today for routine followup regarding his   1. Arthritis of right knee    2. Other tear of medial meniscus, current injury, right knee, initial encounter    . Chief Complaint   Patient presents with    Knee Pain     right        HPI- Mirian Hilario  presents for right knee pain that has been present for approximately 2 weeks. The patient does not recall a specific injury. The pain improves with  Resting and activity modifcation. The pain worsens with  stair climbing and arising from a seated position. Instability is  noted. Patient reports that when descending stairs he feels like he will fall. He has mechanical catching at times as well to the right knee. The patient has not had a previous corticosteroid injection. The patient has not had previous physical therapy for this problem. The patient has tried oral NSAIDs for this problem previously. Patient has tried new shoes without relief. Review of Systems   Constitutional: Negative for chills and fever. Respiratory: Negative for apnea, cough and chest tightness. Cardiovascular: Negative for chest pain and palpitations. Gastrointestinal: Negative for abdominal pain and vomiting. Genitourinary: Negative for difficulty urinating. Musculoskeletal: Positive for arthralgias (right knee). Negative for gait problem, joint swelling and myalgias. Neurological: Negative for dizziness, weakness and numbness. I have reviewed the CC, HPI, ROS, PMH, FHX, Social History, and if not present in this note, I have reviewed in the patient's chart.    I agree with the documentation provided by other staff and have reviewed their documentation prior to providing my signature moderate  degenerative changes as described above. KNEE INJECTION PROCEDURE NOTE:  The patient was identified. The right knee was confirmed with the patient. After a sterile prep with Betadine the knee was injected using a lateral joint line approach with a mixture of 2 mL of 0.25% Marcaine and 80 mg of Depo-Medrol. Patient tolerated the procedure well without post injection complications. I instructed the patient to call our office immediately if they have any swelling or increased pain at the injection site. Assessment:      1. Arthritis of right knee    2. Other tear of medial meniscus, current injury, right knee, initial encounter       Plan:      Reviewed x-rays with the patient today in the office. Discussed etiology and natural history of right knee arthritis and possible medial meniscus tear. The treatment options may include oral anti-inflammatories, bracing, injections, advanced imaging, activity modification, physical therapy and/or surgical intervention. The patient would like to proceed with corticosteroid injection to the right knee and continuing motrin. The patient will follow up in the office as needed. If not better will discuss getting an MRI of the right knee without contrast.   We discussed that the patient should call us with any concerns or questions. Follow up:Return if symptoms worsen or fail to improve. Orders Placed This Encounter   Medications    methylPREDNISolone acetate (DEPO-MEDROL) injection 80 mg    bupivacaine (MARCAINE) 0.25 % injection 5 mg         Orders Placed This Encounter   Procedures    45727 - DRAIN/INJECT LARGE JOINT/BURSA     IBrittny LPN am scribing for and in the presence of Dr. Meka Aviles  4/28/2021 7:19 AM      I have reviewed and made changes accordingly to the work scribed by Brittny Cutris LPN. The documentation accurately reflects work and decisions made by me.   I have also reviewed documentation completed by clinical staff.     Cherry Sen DO, [address] [facility]  4/28/2021 7:19 AM    This note is created with the assistance of a speech recognition program.  While intending to generate a document that actually reflects the content of the visit, the document can still have some errors including those of syntax and sound a like substitutions which may escape proof reading.  In such instances, actual meaning can be extrapolated by contextual diversion      Electronically signed by Casey Caldwell DO, FAOAO on 4/28/2021 at 7:19 AM

## 2021-06-28 ENCOUNTER — FOLLOWUP TELEPHONE ENCOUNTER (OUTPATIENT)
Dept: ORTHOPEDIC SURGERY | Age: 61
End: 2021-06-28

## 2021-06-29 DIAGNOSIS — M17.11 ARTHRITIS OF RIGHT KNEE: Primary | ICD-10-CM

## 2021-06-29 NOTE — TELEPHONE ENCOUNTER
Talked with  Milligan Jonelle this evening. He was last seen in the office on 4/26/2021 for right knee pain and underwent knee corticosteroid at that time. He noted instability symptoms at that visit and also that he feels at times his knee will give out and he feels he may fall with mechanical catching at times.    He continues to have those same symptoms and as a result it is felt that MRI evaluation should be undertaken of the right knee without contrast.

## 2021-07-15 ENCOUNTER — HOSPITAL ENCOUNTER (OUTPATIENT)
Dept: MRI IMAGING | Age: 61
Discharge: HOME | End: 2021-07-17
Payer: COMMERCIAL

## 2021-07-15 DIAGNOSIS — M17.11 ARTHRITIS OF RIGHT KNEE: ICD-10-CM

## 2021-07-15 PROCEDURE — 73721 MRI JNT OF LWR EXTRE W/O DYE: CPT

## 2022-09-07 ENCOUNTER — OFFICE VISIT (OUTPATIENT)
Dept: ORTHOPEDIC SURGERY | Age: 62
End: 2022-09-07
Payer: COMMERCIAL

## 2022-09-07 VITALS — BODY MASS INDEX: 37.91 KG/M2 | WEIGHT: 286 LBS | HEIGHT: 73 IN | OXYGEN SATURATION: 98 %

## 2022-09-07 DIAGNOSIS — M25.522 LEFT ELBOW PAIN: Primary | ICD-10-CM

## 2022-09-07 DIAGNOSIS — S46.212A BICEPS TENDON RUPTURE, LEFT, INITIAL ENCOUNTER: Primary | ICD-10-CM

## 2022-09-07 PROCEDURE — 99213 OFFICE O/P EST LOW 20 MIN: CPT | Performed by: ORTHOPAEDIC SURGERY

## 2022-09-07 ASSESSMENT — ENCOUNTER SYMPTOMS
EYE DISCHARGE: 0
ABDOMINAL PAIN: 0
SHORTNESS OF BREATH: 0
ROS SKIN COMMENTS: NEGATIVE FOR RASH

## 2022-09-07 NOTE — PROGRESS NOTES
[address] [place] [address] AND SPORTS MEDICINE  [facility]el  [address] [place] [address]  Dept: [phone]  Dept Fax: [phone]        Ambulatory Follow Up      Subjective:   Jen Arnold is a 63y.o. year old male who presents to our office today for routine followup regarding his   1. Biceps tendon rupture, left, initial encounter    . Chief Complaint   Patient presents with    Arm Injury     L bicep tendon rupture       HPI  Jade Betts is a 57-year-old right-hand-dominant male who presents to the office today for evaluation of left elbow pain. A few days ago he was lifting a waterborne dock when he felt a pop to his left elbow as he was trying to catch himself while falling. He has had some pain which has been abating over the last few days but he continues to have debility about his left elbow presents the office today for further evaluation. 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 Review of Systems   Constitutional:  Positive for activity change. Negative for fever. HENT:  Negative for dental problem. Eyes:  Negative for discharge. Respiratory:  Negative for shortness of breath. Cardiovascular:  Negative for chest pain. Gastrointestinal:  Negative for abdominal pain. Genitourinary: Negative. Musculoskeletal:  Positive for arthralgias. Skin:         Negative for rash   Neurological:  Positive for weakness. Psychiatric/Behavioral:  Negative for confusion. I have reviewed the CC, HPI, ROS, PMH, FHX, Social History, and if not present in this note, I have reviewed in the patient's chart. I agree with the documentation provided by other staff and have reviewed their documentation prior to providing my signature indicating agreement. Objective :   Ht 6' 1\" (1.854 m)   Wt 286 lb (129.7 kg)   SpO2 98%   BMI 37.73 kg/m²  Body mass index is 37.73 kg/m².   General: Jen Arnold is a 63 y.o. male who is alert and oriented and sitting comfortably in our office. Ortho Exam  MS: Negative hook test of the left elbow is appreciated. The patient has nearly full range of motion left elbow and a positive Allan deformity. Tenderness over the distal biceps insertion is appreciated. No instability of the left elbow is noted. Patient has full range of motion left shoulder and left wrist.  Motor, sensory, vascular examination left upper extremity is grossly intact without focal deficits. Neuro: alert and oriented to person and place. Eyes: Extra-ocular muscles intact  Mouth: Oral mucosa moist. No perioral lesions  Pulm: Respirations unlabored and regular. Symmetric chest excursion without outward deformity is noted. Skin: warm, well perfused  Psych:   Patient has good fund of knowledge and displays understanging of exam, diagnosis, and plan. Radiology:   XR ELBOW LEFT (MIN 3 VIEWS)    Result Date: 9/7/2022  History: Left elbow pain Findings: AP, lateral, oblique x-rays of the left elbow done in the office today shows mild to moderate degenerative changes diffusely about the elbow with mild joint space narrowing and para-articular osteophytosis as well as joint line sclerosis. No evidence of fracture, subluxation, dislocation, radiopaque foreign body, radiopaque tumors noted. Impression: Left elbow mild degenerative changes as described above. Assessment:      1. Biceps tendon rupture, left, initial encounter       Plan:      Clinically the patient has a rupture of the distal biceps on the left. We discussed operative versus nonoperative treatment and he would like to proceed with operative treatment. We also discussed the lengthy rehab with the left arm and the patient notes understanding. All details of the procedure, as well as risks, benefits and alternatives, including the option of non operative versus operative treatment were discussed.   The patient understands that risks of the surgery include but are not limited to: bleeding, malunion/nonunion, loss of fixation, loss of reduction, hardware failure, angular or rotational deformity, length discrepancy, limp, transfusion, skin blistering or breakdown, progressive post traumatic degenerative joint disease, possible need for further surgery, bone grafting, infection, nerve injury, paralysis, numbness, blood vessel injury, excessive scaring, wound complication or breakdown, failure of symptoms to improve or actual deterioration in condition, significant acute and/or chronic pain, possible need for amputation, permanent loss of motion, and permanent loss of function. As well as the general complications of anesthesia, which include but are not limited to: myocardial infarction and/or heart attack, stroke, multi organ system failure or even possible death, prolonged hospital stay, blood clots, pulmonary embolism, abnormal reaction to medication, visual and neurological disturbances, constipation, ischemic bowel, bowel obstruction, bowel perforation, ileus and mental status changes. No guarantees were made. Follow up:No follow-ups on file. No orders of the defined types were placed in this encounter. No orders of the defined types were placed in this encounter. This note is created with the assistance of a speech recognition program.  While intending to generate a document that actually reflects the content of the visit, the document can still have some errors including those of syntax and sound a like substitutions which may escape proof reading.   In such instances, actual meaning can be extrapolated by contextual diversion      Electronically signed by Morro Lewis DO, FAOAO on 9/7/2022 at 4:31 PM

## 2022-09-09 ENCOUNTER — APPOINTMENT (OUTPATIENT)
Dept: GENERAL RADIOLOGY | Age: 62
End: 2022-09-09
Attending: ORTHOPAEDIC SURGERY
Payer: COMMERCIAL

## 2022-09-09 ENCOUNTER — ANESTHESIA (OUTPATIENT)
Dept: OPERATING ROOM | Age: 62
End: 2022-09-09
Payer: COMMERCIAL

## 2022-09-09 ENCOUNTER — ANESTHESIA EVENT (OUTPATIENT)
Dept: OPERATING ROOM | Age: 62
End: 2022-09-09
Payer: COMMERCIAL

## 2022-09-09 ENCOUNTER — HOSPITAL ENCOUNTER (OUTPATIENT)
Age: 62
Setting detail: OUTPATIENT SURGERY
Discharge: HOME | End: 2022-09-09
Attending: ORTHOPAEDIC SURGERY | Admitting: ORTHOPAEDIC SURGERY
Payer: COMMERCIAL

## 2022-09-09 VITALS
HEIGHT: 73 IN | BODY MASS INDEX: 37.47 KG/M2 | HEART RATE: 59 BPM | SYSTOLIC BLOOD PRESSURE: 120 MMHG | DIASTOLIC BLOOD PRESSURE: 70 MMHG | OXYGEN SATURATION: 97 % | WEIGHT: 282.7 LBS | RESPIRATION RATE: 13 BRPM | TEMPERATURE: 97 F

## 2022-09-09 DIAGNOSIS — S46.212A RUPTURE OF LEFT DISTAL BICEPS TENDON, INITIAL ENCOUNTER: Primary | ICD-10-CM

## 2022-09-09 LAB
ANION GAP SERPL CALCULATED.3IONS-SCNC: 10 MMOL/L (ref 9–17)
BUN BLDV-MCNC: 18 MG/DL (ref 6–20)
BUN/CREAT BLD: 20 (ref 9–20)
CALCIUM SERPL-MCNC: 9.6 MG/DL (ref 8.6–10.4)
CHLORIDE BLD-SCNC: 105 MMOL/L (ref 98–107)
CO2: 25 MMOL/L (ref 20–31)
CREAT SERPL-MCNC: 0.9 MG/DL (ref 0.7–1.2)
GFR AFRICAN AMERICAN: >60 ML/MIN
GFR NON-AFRICAN AMERICAN: >60 ML/MIN
GFR SERPL CREATININE-BSD FRML MDRD: ABNORMAL ML/MIN/{1.73_M2}
GLUCOSE BLD-MCNC: 168 MG/DL (ref 75–110)
GLUCOSE BLD-MCNC: 293 MG/DL (ref 75–110)
GLUCOSE BLD-MCNC: 301 MG/DL (ref 70–99)
HCT VFR BLD CALC: 45.2 % (ref 40.7–50.3)
HEMOGLOBIN: 15 G/DL (ref 13–17)
MCH RBC QN AUTO: 28.8 PG (ref 25.2–33.5)
MCHC RBC AUTO-ENTMCNC: 33.2 G/DL (ref 28.4–34.8)
MCV RBC AUTO: 86.9 FL (ref 82.6–102.9)
NRBC AUTOMATED: 0 PER 100 WBC
PDW BLD-RTO: 12.3 % (ref 11.8–14.4)
PLATELET # BLD: 210 K/UL (ref 138–453)
PMV BLD AUTO: 9.8 FL (ref 8.1–13.5)
POTASSIUM SERPL-SCNC: 4 MMOL/L (ref 3.7–5.3)
RBC # BLD: 5.2 M/UL (ref 4.21–5.77)
SODIUM BLD-SCNC: 140 MMOL/L (ref 135–144)
WBC # BLD: 7.2 K/UL (ref 3.5–11.3)

## 2022-09-09 PROCEDURE — 7100000010 HC PHASE II RECOVERY - FIRST 15 MIN: Performed by: ORTHOPAEDIC SURGERY

## 2022-09-09 PROCEDURE — 2709999900 HC NON-CHARGEABLE SUPPLY: Performed by: ORTHOPAEDIC SURGERY

## 2022-09-09 PROCEDURE — C1713 ANCHOR/SCREW BN/BN,TIS/BN: HCPCS | Performed by: ORTHOPAEDIC SURGERY

## 2022-09-09 PROCEDURE — 82947 ASSAY GLUCOSE BLOOD QUANT: CPT

## 2022-09-09 PROCEDURE — 2580000003 HC RX 258: Performed by: NURSE ANESTHETIST, CERTIFIED REGISTERED

## 2022-09-09 PROCEDURE — 6360000002 HC RX W HCPCS: Performed by: ANESTHESIOLOGY

## 2022-09-09 PROCEDURE — 85027 COMPLETE CBC AUTOMATED: CPT

## 2022-09-09 PROCEDURE — 7100000000 HC PACU RECOVERY - FIRST 15 MIN: Performed by: ORTHOPAEDIC SURGERY

## 2022-09-09 PROCEDURE — 3600000012 HC SURGERY LEVEL 2 ADDTL 15MIN: Performed by: ORTHOPAEDIC SURGERY

## 2022-09-09 PROCEDURE — 6370000000 HC RX 637 (ALT 250 FOR IP): Performed by: ANESTHESIOLOGY

## 2022-09-09 PROCEDURE — 3700000000 HC ANESTHESIA ATTENDED CARE: Performed by: ORTHOPAEDIC SURGERY

## 2022-09-09 PROCEDURE — 3209999900 FLUORO FOR SURGICAL PROCEDURES

## 2022-09-09 PROCEDURE — 3700000001 HC ADD 15 MINUTES (ANESTHESIA): Performed by: ORTHOPAEDIC SURGERY

## 2022-09-09 PROCEDURE — 3600000002 HC SURGERY LEVEL 2 BASE: Performed by: ORTHOPAEDIC SURGERY

## 2022-09-09 PROCEDURE — 2500000003 HC RX 250 WO HCPCS: Performed by: NURSE ANESTHETIST, CERTIFIED REGISTERED

## 2022-09-09 PROCEDURE — 2720000010 HC SURG SUPPLY STERILE: Performed by: ORTHOPAEDIC SURGERY

## 2022-09-09 PROCEDURE — 6360000002 HC RX W HCPCS: Performed by: NURSE ANESTHETIST, CERTIFIED REGISTERED

## 2022-09-09 PROCEDURE — 7100000011 HC PHASE II RECOVERY - ADDTL 15 MIN: Performed by: ORTHOPAEDIC SURGERY

## 2022-09-09 PROCEDURE — 2580000003 HC RX 258: Performed by: ANESTHESIOLOGY

## 2022-09-09 PROCEDURE — 2500000003 HC RX 250 WO HCPCS: Performed by: ANESTHESIOLOGY

## 2022-09-09 PROCEDURE — 64415 NJX AA&/STRD BRCH PLXS IMG: CPT | Performed by: ANESTHESIOLOGY

## 2022-09-09 PROCEDURE — 80048 BASIC METABOLIC PNL TOTAL CA: CPT

## 2022-09-09 PROCEDURE — 7100000001 HC PACU RECOVERY - ADDTL 15 MIN: Performed by: ORTHOPAEDIC SURGERY

## 2022-09-09 PROCEDURE — 24342 REPAIR OF RUPTURED TENDON: CPT | Performed by: ORTHOPAEDIC SURGERY

## 2022-09-09 DEVICE — KIT IMPL DST BICEPS BTTN INSRT W/ NO2 FIBERLOOP SUT: Type: IMPLANTABLE DEVICE | Site: ARM | Status: FUNCTIONAL

## 2022-09-09 RX ORDER — FENTANYL CITRATE 50 UG/ML
25 INJECTION, SOLUTION INTRAMUSCULAR; INTRAVENOUS EVERY 5 MIN PRN
Status: DISCONTINUED | OUTPATIENT
Start: 2022-09-09 | End: 2022-09-09 | Stop reason: HOSPADM

## 2022-09-09 RX ORDER — DOCUSATE SODIUM 100 MG/1
100 CAPSULE, LIQUID FILLED ORAL 2 TIMES DAILY PRN
Qty: 60 CAPSULE | Refills: 0 | Status: SHIPPED | OUTPATIENT
Start: 2022-09-09

## 2022-09-09 RX ORDER — CEFAZOLIN SODIUM 1 G/3ML
INJECTION, POWDER, FOR SOLUTION INTRAMUSCULAR; INTRAVENOUS PRN
Status: DISCONTINUED | OUTPATIENT
Start: 2022-09-09 | End: 2022-09-09 | Stop reason: SDUPTHER

## 2022-09-09 RX ORDER — ONDANSETRON 2 MG/ML
INJECTION INTRAMUSCULAR; INTRAVENOUS PRN
Status: DISCONTINUED | OUTPATIENT
Start: 2022-09-09 | End: 2022-09-09 | Stop reason: SDUPTHER

## 2022-09-09 RX ORDER — SODIUM CHLORIDE 0.9 % (FLUSH) 0.9 %
5-40 SYRINGE (ML) INJECTION PRN
Status: DISCONTINUED | OUTPATIENT
Start: 2022-09-09 | End: 2022-09-09 | Stop reason: HOSPADM

## 2022-09-09 RX ORDER — LIDOCAINE HYDROCHLORIDE 10 MG/ML
INJECTION, SOLUTION INFILTRATION; PERINEURAL PRN
Status: DISCONTINUED | OUTPATIENT
Start: 2022-09-09 | End: 2022-09-09 | Stop reason: SDUPTHER

## 2022-09-09 RX ORDER — OXYCODONE HYDROCHLORIDE AND ACETAMINOPHEN 5; 325 MG/1; MG/1
1 TABLET ORAL EVERY 6 HOURS PRN
Qty: 28 TABLET | Refills: 0 | Status: SHIPPED | OUTPATIENT
Start: 2022-09-09 | End: 2022-09-16

## 2022-09-09 RX ORDER — SODIUM CHLORIDE 0.9 % (FLUSH) 0.9 %
5-40 SYRINGE (ML) INJECTION EVERY 12 HOURS SCHEDULED
Status: DISCONTINUED | OUTPATIENT
Start: 2022-09-09 | End: 2022-09-09 | Stop reason: HOSPADM

## 2022-09-09 RX ORDER — PROPOFOL 10 MG/ML
INJECTION, EMULSION INTRAVENOUS PRN
Status: DISCONTINUED | OUTPATIENT
Start: 2022-09-09 | End: 2022-09-09 | Stop reason: SDUPTHER

## 2022-09-09 RX ORDER — LIDOCAINE HYDROCHLORIDE 20 MG/ML
INJECTION, SOLUTION EPIDURAL; INFILTRATION; INTRACAUDAL; PERINEURAL PRN
Status: DISCONTINUED | OUTPATIENT
Start: 2022-09-09 | End: 2022-09-09 | Stop reason: SDUPTHER

## 2022-09-09 RX ORDER — MIDAZOLAM HYDROCHLORIDE 1 MG/ML
2 INJECTION INTRAMUSCULAR; INTRAVENOUS ONCE
Status: COMPLETED | OUTPATIENT
Start: 2022-09-09 | End: 2022-09-09

## 2022-09-09 RX ORDER — LIDOCAINE HYDROCHLORIDE 10 MG/ML
1 INJECTION, SOLUTION EPIDURAL; INFILTRATION; INTRACAUDAL; PERINEURAL
Status: DISCONTINUED | OUTPATIENT
Start: 2022-09-09 | End: 2022-09-09 | Stop reason: HOSPADM

## 2022-09-09 RX ORDER — FENTANYL CITRATE 50 UG/ML
INJECTION, SOLUTION INTRAMUSCULAR; INTRAVENOUS PRN
Status: DISCONTINUED | OUTPATIENT
Start: 2022-09-09 | End: 2022-09-09 | Stop reason: SDUPTHER

## 2022-09-09 RX ORDER — ONDANSETRON 2 MG/ML
4 INJECTION INTRAMUSCULAR; INTRAVENOUS
Status: DISCONTINUED | OUTPATIENT
Start: 2022-09-09 | End: 2022-09-09 | Stop reason: HOSPADM

## 2022-09-09 RX ORDER — BUPIVACAINE HYDROCHLORIDE 5 MG/ML
INJECTION, SOLUTION EPIDURAL; INTRACAUDAL
Status: DISCONTINUED
Start: 2022-09-09 | End: 2022-09-09 | Stop reason: WASHOUT

## 2022-09-09 RX ORDER — SODIUM CHLORIDE 9 MG/ML
INJECTION, SOLUTION INTRAVENOUS PRN
Status: DISCONTINUED | OUTPATIENT
Start: 2022-09-09 | End: 2022-09-09 | Stop reason: HOSPADM

## 2022-09-09 RX ORDER — DEXAMETHASONE SODIUM PHOSPHATE 10 MG/ML
INJECTION, SOLUTION INTRAMUSCULAR; INTRAVENOUS PRN
Status: DISCONTINUED | OUTPATIENT
Start: 2022-09-09 | End: 2022-09-09 | Stop reason: SDUPTHER

## 2022-09-09 RX ORDER — KETOROLAC TROMETHAMINE 30 MG/ML
INJECTION, SOLUTION INTRAMUSCULAR; INTRAVENOUS PRN
Status: DISCONTINUED | OUTPATIENT
Start: 2022-09-09 | End: 2022-09-09 | Stop reason: SDUPTHER

## 2022-09-09 RX ORDER — ROPIVACAINE HYDROCHLORIDE 5 MG/ML
INJECTION, SOLUTION EPIDURAL; INFILTRATION; PERINEURAL PRN
Status: DISCONTINUED | OUTPATIENT
Start: 2022-09-09 | End: 2022-09-09 | Stop reason: SDUPTHER

## 2022-09-09 RX ORDER — HYDROMORPHONE HYDROCHLORIDE 1 MG/ML
0.5 INJECTION, SOLUTION INTRAMUSCULAR; INTRAVENOUS; SUBCUTANEOUS EVERY 5 MIN PRN
Status: DISCONTINUED | OUTPATIENT
Start: 2022-09-09 | End: 2022-09-09 | Stop reason: HOSPADM

## 2022-09-09 RX ORDER — SODIUM CHLORIDE 9 MG/ML
INJECTION, SOLUTION INTRAVENOUS CONTINUOUS
Status: DISCONTINUED | OUTPATIENT
Start: 2022-09-09 | End: 2022-09-09 | Stop reason: HOSPADM

## 2022-09-09 RX ORDER — SODIUM CHLORIDE, SODIUM LACTATE, POTASSIUM CHLORIDE, CALCIUM CHLORIDE 600; 310; 30; 20 MG/100ML; MG/100ML; MG/100ML; MG/100ML
INJECTION, SOLUTION INTRAVENOUS CONTINUOUS PRN
Status: DISCONTINUED | OUTPATIENT
Start: 2022-09-09 | End: 2022-09-09 | Stop reason: SDUPTHER

## 2022-09-09 RX ORDER — SODIUM CHLORIDE, SODIUM LACTATE, POTASSIUM CHLORIDE, CALCIUM CHLORIDE 600; 310; 30; 20 MG/100ML; MG/100ML; MG/100ML; MG/100ML
INJECTION, SOLUTION INTRAVENOUS CONTINUOUS
Status: DISCONTINUED | OUTPATIENT
Start: 2022-09-09 | End: 2022-09-09 | Stop reason: HOSPADM

## 2022-09-09 RX ORDER — BUPIVACAINE HYDROCHLORIDE AND EPINEPHRINE 5; 5 MG/ML; UG/ML
INJECTION, SOLUTION EPIDURAL; INTRACAUDAL; PERINEURAL
Status: DISCONTINUED
Start: 2022-09-09 | End: 2022-09-09 | Stop reason: WASHOUT

## 2022-09-09 RX ORDER — DEXAMETHASONE SODIUM PHOSPHATE 4 MG/ML
INJECTION, SOLUTION INTRA-ARTICULAR; INTRALESIONAL; INTRAMUSCULAR; INTRAVENOUS; SOFT TISSUE PRN
Status: DISCONTINUED | OUTPATIENT
Start: 2022-09-09 | End: 2022-09-09 | Stop reason: SDUPTHER

## 2022-09-09 RX ADMIN — Medication 100 MCG: at 11:38

## 2022-09-09 RX ADMIN — PROPOFOL 200 MG: 10 INJECTION, EMULSION INTRAVENOUS at 11:38

## 2022-09-09 RX ADMIN — ONDANSETRON 4 MG: 2 INJECTION INTRAMUSCULAR; INTRAVENOUS at 11:42

## 2022-09-09 RX ADMIN — LIDOCAINE HYDROCHLORIDE 80 MG: 20 INJECTION, SOLUTION EPIDURAL; INFILTRATION; INTRACAUDAL; PERINEURAL at 11:38

## 2022-09-09 RX ADMIN — SODIUM CHLORIDE, POTASSIUM CHLORIDE, SODIUM LACTATE AND CALCIUM CHLORIDE: 600; 310; 30; 20 INJECTION, SOLUTION INTRAVENOUS at 10:32

## 2022-09-09 RX ADMIN — INSULIN HUMAN 8 UNITS: 100 INJECTION, SOLUTION PARENTERAL at 10:37

## 2022-09-09 RX ADMIN — CEFAZOLIN 3000 MG: 1 INJECTION, POWDER, FOR SOLUTION INTRAMUSCULAR; INTRAVENOUS at 11:42

## 2022-09-09 RX ADMIN — DEXAMETHASONE SODIUM PHOSPHATE 10 MG: 10 INJECTION, SOLUTION INTRAMUSCULAR; INTRAVENOUS at 11:42

## 2022-09-09 RX ADMIN — LIDOCAINE HYDROCHLORIDE 3 MG: 10 INJECTION, SOLUTION INFILTRATION; PERINEURAL at 11:14

## 2022-09-09 RX ADMIN — ROPIVACAINE HYDROCHLORIDE 30 ML: 5 INJECTION, SOLUTION EPIDURAL; INFILTRATION; PERINEURAL at 11:14

## 2022-09-09 RX ADMIN — MIDAZOLAM 2 MG: 1 INJECTION INTRAMUSCULAR; INTRAVENOUS at 11:11

## 2022-09-09 RX ADMIN — SODIUM CHLORIDE, POTASSIUM CHLORIDE, SODIUM LACTATE AND CALCIUM CHLORIDE: 600; 310; 30; 20 INJECTION, SOLUTION INTRAVENOUS at 11:31

## 2022-09-09 RX ADMIN — KETOROLAC TROMETHAMINE 30 MG: 30 INJECTION, SOLUTION INTRAMUSCULAR at 12:41

## 2022-09-09 RX ADMIN — DEXAMETHASONE SODIUM PHOSPHATE 4 MG: 4 INJECTION, SOLUTION INTRAMUSCULAR; INTRAVENOUS at 11:14

## 2022-09-09 ASSESSMENT — PAIN DESCRIPTION - DESCRIPTORS: DESCRIPTORS: DULL

## 2022-09-09 ASSESSMENT — PAIN - FUNCTIONAL ASSESSMENT: PAIN_FUNCTIONAL_ASSESSMENT: 0-10

## 2022-09-09 NOTE — ANESTHESIA PRE PROCEDURE
Department of Anesthesiology  Preprocedure Note       Name:  Leo Cardenas   Age:  63 y.o.  :  1960                                          MRN:  [id number]         Date:  2022      Surgeon: Christian Torrez):  Jenniffer Dumas DO    Procedure: Procedure(s):  LEFT BICEPS REATTACHMENT - PRADIP   ARTHREX    Medications prior to admission:   Prior to Admission medications    Medication Sig Start Date End Date Taking? Authorizing Provider   oxyCODONE-acetaminophen (PERCOCET) 5-325 MG per tablet Take 1 tablet by mouth every 6 hours as needed for Pain for up to 7 days. Intended supply: 7 days. Take lowest dose possible to manage pain 22 Yes Jovanni Turner,    docusate sodium (COLACE) 100 MG capsule Take 1 capsule by mouth 2 times daily as needed for Constipation 22  Yes Alethea Borden, DO   Handicap Placard MISC by Does not apply route Duration 6 months starting 2019. 19   Jenniffer Dumas DO   sildenafil (VIAGRA) 100 MG tablet Take 100 mg by mouth as needed 18   Historical Provider, MD   Misc. Devices MISC Shower Bench 19   Jenniffer Dumas, DO   Misc. Devices MISC Rolling  Walker 19   Jenniffer Dumas, DO   Misc.  Devices MISC Bedside Commode 19   Main Peña, DO   aspirin 81 MG chewable tablet Take 81 mg by mouth daily    Historical Provider, MD   ibuprofen (ADVIL;MOTRIN) 800 MG tablet Take 1 tablet by mouth as needed 2/15/19   Historical Provider, MD   quinapril-hydrochlorothiazide (ACCURETIC) 20-25 MG per tablet Take 1 tablet by mouth daily 19   Historical Provider, MD   sitaGLIPtan-metFORMIN (JANUMET)  MG per tablet Take 1 tablet by mouth Daily with supper  2/15/19   Historical Provider, MD       Current medications:    Current Facility-Administered Medications   Medication Dose Route Frequency Provider Last Rate Last Admin    lidocaine PF 1 % injection 1 mL  1 mL IntraDERmal Once PRN Kenia Cotton MD        0.9 % sodium chloride infusion   IntraVENous Continuous Ndal Soren Dean MD        lactated ringers infusion   IntraVENous Continuous Jennifer Mcbride  mL/hr at 09/09/22 1032 New Bag at 09/09/22 1032    sodium chloride flush 0.9 % injection 5-40 mL  5-40 mL IntraVENous 2 times per day Jennifer Mcbride MD        sodium chloride flush 0.9 % injection 5-40 mL  5-40 mL IntraVENous PRN Jennifer Mcbride MD        0.9 % sodium chloride infusion   IntraVENous PRN Jennifer Mcbride MD        bupivacaine-EPINEPHrine PF (MARCAINE-w/EPINEPHRINE) 0.5% -1:[id number] injection             bupivacaine (PF) (MARCAINE) 0.5 % injection                Allergies:  No Known Allergies    Problem List:    Patient Active Problem List   Diagnosis Code    History of total left hip arthroplasty - 8/12/19 Z96.642    Arthritis of left hip M16.12    Essential hypertension I10    Type 2 diabetes mellitus without complication ([address] F87.4    Hypokalemia E87.6    Status post total replacement of left hip Z96.642       Past Medical History:        Diagnosis Date    Arthritis of left hip 06/2019    Basal cell carcinoma     face & neck    Essential hypertension     H/O Bell's palsy 2018    Type 2 diabetes mellitus without complication ([address]     Wears glasses        Past Surgical History:        Procedure Laterality Date    COLONOSCOPY  02/20/2017    3 Polypectomey    MOHS SURGERY  [id number], 11/2/16    TOTAL HIP ARTHROPLASTY Left 8/12/2019    HIP TOTAL ARTHROPLASTY- LEFT ANTERIOR APPROACH WITH MEDACTA & C ARM performed by Freddy Bonds DO at [facility] History:    Social History     Tobacco Use    Smoking status: Every Day     Types: Cigars    Smokeless tobacco: Never   Substance Use Topics    Alcohol use:  Yes                                Ready to quit: Not Answered  Counseling given: Not Answered      Vital Signs (Current):   Vitals:    09/09/22 1004 09/09/22 1108 09/09/22 1112 09/09/22 1115   BP:  137/71 (!) 143/77 134/78 Pulse:  68 72 75   Resp:  16 16 22   Temp: 97.3 °F (36.3 °C)      TempSrc: Temporal      SpO2:  99%     Weight: 282 lb 11.2 oz (128.2 kg)      Height: 6' 1\" (1.854 m)                                                 BP Readings from Last 3 Encounters:   09/09/22 134/78   08/13/19 (!) 106/56   08/12/19 (!) 86/52       NPO Status: Time of last liquid consumption: 2100                        Time of last solid consumption: 2100                        Date of last liquid consumption: 09/08/22                        Date of last solid food consumption: 09/08/22    BMI:   Wt Readings from Last 3 Encounters:   09/09/22 282 lb 11.2 oz (128.2 kg)   09/07/22 286 lb (129.7 kg)   04/26/21 297 lb 9.6 oz (135 kg)     Body mass index is 37.3 kg/m².     CBC:   Lab Results   Component Value Date/Time    WBC 7.2 09/09/2022 10:30 AM    RBC 5.20 09/09/2022 10:30 AM    HGB 15.0 09/09/2022 10:30 AM    HCT 45.2 09/09/2022 10:30 AM    MCV 86.9 09/09/2022 10:30 AM    RDW 12.3 09/09/2022 10:30 AM     09/09/2022 10:30 AM       CMP:   Lab Results   Component Value Date/Time     09/09/2022 10:30 AM    K 4.0 09/09/2022 10:30 AM     09/09/2022 10:30 AM    CO2 25 09/09/2022 10:30 AM    BUN 18 09/09/2022 10:30 AM    CREATININE 0.90 09/09/2022 10:30 AM    GFRAA >60 09/09/2022 10:30 AM    LABGLOM >60 09/09/2022 10:30 AM    GLUCOSE 301 09/09/2022 10:30 AM    PROT 7.1 07/29/2019 11:28 AM    CALCIUM 9.6 09/09/2022 10:30 AM    BILITOT 0.73 07/29/2019 11:28 AM    ALKPHOS 35 07/29/2019 11:28 AM    AST 17 07/29/2019 11:28 AM    ALT 21 07/29/2019 11:28 AM       POC Tests:   Recent Labs     09/09/22  1024   POCGLU 293*       Coags: No results found for: PROTIME, INR, APTT    HCG (If Applicable): No results found for: PREGTESTUR, PREGSERUM, HCG, HCGQUANT     ABGs: No results found for: PHART, PO2ART, CFY6LHJ, SVI8EJZ, BEART, W6GAZWCU     Type & Screen (If Applicable):  No results found for: LABABO, LABRH    Drug/Infectious Status (If

## 2022-09-09 NOTE — ANESTHESIA PROCEDURE NOTES
Peripheral Block    Patient location during procedure: procedure area  Reason for block: post-op pain management and at surgeon's request  Start time: 9/9/2022 11:10 AM  End time: 9/9/2022 11:15 AM  Staffing  Performed: other anesthesia staff   Anesthesiologist: Celia Vogt DO  Other anesthesia staff: Ralph Ortega RN  Preanesthetic Checklist  Completed: patient identified, IV checked, site marked, risks and benefits discussed, surgical/procedural consents, equipment checked, pre-op evaluation, timeout performed, anesthesia consent given, oxygen available and monitors applied/VS acknowledged  Peripheral Block   Patient position: sitting  Prep: ChloraPrep  Provider prep: mask and sterile gloves  Patient monitoring: cardiac monitor, continuous pulse ox, frequent blood pressure checks and IV access  Block type: Brachial plexus  Supraclavicular  Laterality: left  Injection technique: single-shot  Guidance: ultrasound guided  Local infiltration: lidocaine  Infiltration strength: 1 %  Local infiltration: lidocaine  Dose: 3 mL    Needle   Needle type: combined needle/nerve stimulator   Needle gauge: 22 G  Needle localization: ultrasound guidance  Needle length: 5 cm  Assessment   Injection assessment: negative aspiration for heme, no paresthesia on injection and local visualized surrounding nerve on ultrasound  Paresthesia pain: none  Slow fractionated injection: yes  Hemodynamics: stable  Real-time US image taken/store: yes  Outcomes: uncomplicated    Additional Notes  Left supraclavicular single shot   30ml 0.5% ropivacaine + 4mg decadron

## 2022-09-09 NOTE — DISCHARGE INSTRUCTIONS
Orthopedic Instructions:  -Weight bearing status: Sling for comfort. Ok for gentle range of motion of the elbow. No weight bearing with the left upper extremity.   -Keep dressing clean and dry. Ok to remove ace wrap and cotton dressing on 9/12. It is ok for adhesive surgical dressing on until follow up.   -Starting 3 days after surgery. Ok to shower but no soaks or baths.  -Physical Therapy for strengthening and gait training. Occupational therapy for activities of daily living.  -Ice (20 minutes on and off 1 hour) and elevate (above heart) as needed for swelling/pain  -Drink plenty of fluids.  -Call the office or come to Emergency Room if signs of infection appear (hot, swollen, red, draining pus, fever)  -Take medications as prescribed.  -Wean off narcotics (percocet/norco) as soon as possible. Do not take tylenol if still taking narcotics. -No alcoholic beverages or driving/operating machinery while on narcotics  -Follow up with Dr. Tracy Graham in his office in 10-14 days after surgery/discharge. Call [phone]   to schedule.

## 2022-09-09 NOTE — ANESTHESIA POSTPROCEDURE EVALUATION
Department of Anesthesiology  Postprocedure Note    Patient: Dominic Boone  MRN: [id number]  YOB: 1960  Date of evaluation: 9/9/2022      Procedure Summary     Date: 09/09/22 Room / Location: [place] [address]    Anesthesia Start: 6472 Anesthesia Stop: 1301    Procedure: LEFT BICEPS REATTACHMENT - PRADIP   ARTHREX (Left: Arm Upper) Diagnosis:       Rupture of left biceps tendon, initial encounter      (Rupture of left biceps tendon, initial encounter [M51.190H])    Surgeons: Michael Loya DO Responsible Provider: Romain Melendrez DO    Anesthesia Type: general ASA Status: 3          Anesthesia Type: No value filed.     Sunni Phase I: Sunni Score: 10    Sunni Phase II: Sunni Score: 10      Anesthesia Post Evaluation    Patient location during evaluation: PACU  Patient participation: complete - patient participated  Level of consciousness: awake and alert  Airway patency: patent  Nausea & Vomiting: no nausea and no vomiting  Complications: no  Cardiovascular status: hemodynamically stable  Respiratory status: acceptable  Hydration status: stable

## 2022-09-09 NOTE — BRIEF OP NOTE
Brief Postoperative Note      Patient: Ra Bell  YOB: 1960  MRN: [id number]    Date of Procedure: 9/9/2022    Pre-Op Diagnosis: Rupture of left biceps tendon, initial encounter Nancie November    Post-Op Diagnosis: LEFT DISTAL BICEPS TENDON RUPTURE        Procedure(s):  LEFT BICEPS REATTACHMENT - PRADIP   ARTHREX    Surgeon(s):  Cathie Darby DO    Assistant:  Resident: Nancy Tellez DO; Yamilet Torres DO    Anesthesia: General    Estimated Blood Loss (mL): 78YK    Complications: None    TT: 41 minutes    Specimens:   * No specimens in log *    Implants:  Implant Name Type Inv. Item Serial No.  Lot No. LRB No. Used Action   KIT IMPL DST BICEPS BTTN INSRT W/ NO2 FIBERLOOP SUT - [id number]  KIT IMPL DST BICEPS BTTN INSRT W/ NO2 FIBERLOOP SUT  [employer] [id number] Left 1 Implanted         Drains: * No LDAs found *    Findings: Left distal biceps tendon rupture.      Electronically signed by Yamilet Torres DO on 9/9/2022 at 12:58 PM

## 2022-09-09 NOTE — H&P
Interval H&P Note    Pt Name: Ra Bell  MRN: [id number]  [place]: 1960  Date of evaluation: 9/9/2022      [x] I have reviewed in [facility] the Orthopedic Progress Note by Dr Cathie Darby dated 9/7/22 attached below for the sInterval History and Physical note. [x] I have examined  Ra Bell  There are no changes to the patient who is scheduled for LEFT BICEPS REATTACHMENT - PRADIP   ARTHREX by Cathie Darby DO for Rupture of left biceps tendon, initial encounter [S95.104O]. The patient denies new health changes, fever, chills, wheezing, cough, increased SOB, chest pain, open sores or wounds. +DM POC BS (refer to epic)   Last ASA 81mg 9/8/22 Ibuprofen > week      Patient had COVID 2 weeks ago  Wife had COVID > 9 days ago He stated He tested negative within the past 9 days. PMH, Surgical History, Social History, Psych, and Family History reviewed and updated in EPIC in appropriate section. Vital signs: BP (!) 149/84   Pulse 70   Temp 97.3 °F (36.3 °C) (Temporal)   Resp 20   Ht 6' 1\" (1.854 m)   Wt 282 lb 11.2 oz (128.2 kg)   BMI 37.30 kg/m²     Allergies:  Patient has no known allergies. Medications:    Prior to Admission medications    Medication Sig Start Date End Date Taking? Authorizing Provider   Handicap Placard MISC by Does not apply route Duration 6 months starting 08/23/2019. 8/23/19   Cathie Darby DO   docusate sodium (COLACE) 100 MG capsule Take 1 capsule by mouth 2 times daily as needed for Constipation  Patient not taking: Reported on 9/9/2022 8/12/19   Starr Stanton, DO   sildenafil (VIAGRA) 100 MG tablet Take 100 mg by mouth as needed 12/21/18   Historical Provider, MD   Misc. Devices MISC Shower Bench 6/9/19   Cathie Darby, DO   Misc. Devices MISC Rolling  Walker 6/9/19   Cathie Darby, DO   Misc.  Devices MISC Bedside Commode 6/9/19   Main Hickman, DO   aspirin 81 MG chewable tablet Take 81 mg by mouth daily    Historical Provider, MD   ibuprofen (ADVIL;MOTRIN) 800 MG tablet Take 1 tablet by mouth as needed 2/15/19   Historical Provider, MD   quinapril-hydrochlorothiazide (ACCURETIC) 20-25 MG per tablet Take 1 tablet by mouth daily 5/17/19   Historical Provider, MD   sitaGLIPtan-metFORMIN (JANUMET)  MG per tablet Take 1 tablet by mouth Daily with supper  2/15/19   Historical Provider, MD         This is a 63 y.o.obese male who is pleasant, cooperative, alert and oriented x3, in no acute distress. Heart: Heart sounds are normal.  HR 70  regular rate and rhythm without murmur, gallop or rub. Lungs: Normal respiratory effort with equal expansion, good air exchange, unlabored and clear to auscultation without wheezes or rales bilaterally   Abdomen: round, obese, nontender, nondistended with bowel sounds . Extremities: equal bilateral radial pulses and hand grasps. Hands warm with quick capillary refill. Labs:  No results for input(s): HGB, HCT, WBC, MCV, PLT, NA, K, CL, CO2, BUN, CREATININE, GLUCOSE, INR, PROTIME, APTT, AST, ALT, LABALBU, HCG in the last 720 hours. No results for input(s): COVID19 in the last 720 hours. ZEV [place] CNP  Electronically signed 9/9/2022 at 10:09 AM       Randal Pretty DO   Physician   Specialty:  Orthopedic Surgery   Progress Notes       Signed   Encounter Date:  9/7/2022          Related encounter: Office Visit from 9/7/2022 in [address] [place] [address] and [address] [place] [address] [facility]  [address] [address]  Dept: [phone]  Dept Fax: [phone]         Ambulatory Follow Up        Subjective:   Galina Calvin is a 63y.o. year old male who presents to our office today for routine followup regarding his   1. Biceps tendon rupture, left, initial encounter    .           Chief Complaint   Patient examination left upper extremity is grossly intact without focal deficits. Neuro: alert and oriented to person and place. Eyes: Extra-ocular muscles intact  Mouth: Oral mucosa moist. No perioral lesions  Pulm: Respirations unlabored and regular. Symmetric chest excursion without outward deformity is noted. Skin: warm, well perfused  Psych:   Patient has good fund of knowledge and displays understanging of exam, diagnosis, and plan. Radiology:   XR ELBOW LEFT (MIN 3 VIEWS)     Result Date: 9/7/2022  History: Left elbow pain Findings: AP, lateral, oblique x-rays of the left elbow done in the office today shows mild to moderate degenerative changes diffusely about the elbow with mild joint space narrowing and para-articular osteophytosis as well as joint line sclerosis. No evidence of fracture, subluxation, dislocation, radiopaque foreign body, radiopaque tumors noted. Impression: Left elbow mild degenerative changes as described above. Assessment:   1. Biceps tendon rupture, left, initial encounter    Plan:   Clinically the patient has a rupture of the distal biceps on the left. We discussed operative versus nonoperative treatment and he would like to proceed with operative treatment. We also discussed the lengthy rehab with the left arm and the patient notes understanding. All details of the procedure, as well as risks, benefits and alternatives, including the option of non operative versus operative treatment were discussed.   The patient understands that risks of the surgery include but are not limited to: bleeding, malunion/nonunion, loss of fixation, loss of reduction, hardware failure, angular or rotational deformity, length discrepancy, limp, transfusion, skin blistering or breakdown, progressive post traumatic degenerative joint disease, possible need for further surgery, bone grafting, infection, nerve injury, paralysis, numbness, blood vessel injury, excessive scaring, wound complication or breakdown, failure of symptoms to improve or actual deterioration in condition, significant acute and/or chronic pain, possible need for amputation, permanent loss of motion, and permanent loss of function. As well as the general complications of anesthesia, which include but are not limited to: myocardial infarction and/or heart attack, stroke, multi organ system failure or even possible death, prolonged hospital stay, blood clots, pulmonary embolism, abnormal reaction to medication, visual and neurological disturbances, constipation, ischemic bowel, bowel obstruction, bowel perforation, ileus and mental status changes. No guarantees were made. Follow up:No follow-ups on file. Encounter Medications    No orders of the defined types were placed in this encounter. No orders of the defined types were placed in this encounter. This note is created with the assistance of a speech recognition program.  While intending to generate a document that actually reflects the content of the visit, the document can still have some errors including those of syntax and sound a like substitutions which may escape proof reading.   In such instances, actual meaning can be extrapolated by contextual diversion        Electronically signed by Luanne Laura DO, FAOAO on 9/7/2022 at 4:31 PM

## 2022-09-10 NOTE — OP NOTE
[id number] [facility] [address]                [address] [place] [address]                                OPERATIVE REPORT    PATIENT NAME: Panfilo Mata                :        1960  MED REC NO:   [id number]                             ROOM:  ACCOUNT NO:   [de-identified]                           ADMIT DATE: 2022  PROVIDER:     Guera Cunha DO    DATE OF PROCEDURE:  2022    PREOPERATIVE DIAGNOSIS:  Left distal biceps tendon rupture. POSTOPERATIVE DIAGNOSIS:  Left distal biceps tendon rupture. OPERATION PERFORMED:  Left distal biceps tendon repair with Arthrex  button. SURGEON:  Marlene Allred DO.    ASSISTANT RESIDENT:  Jovanni Perez DO, PGY-5, and Jayde Holman DO, PGY-1.    ANESTHESIA:  General.    ESTIMATED BLOOD LOSS:  20 mL. COMPLICATIONS:  None. TOURNIQUET TIME:  41 minutes. SPECIMENS:  None. IMPLANTS:  Arthrex distal biceps kit. DRAINS:  None. INDICATIONS FOR THE PROCEDURE:  The patient is a 68-year-old male who  sustained a left distal biceps tendon rupture over Labor day weekend  while cleaning up his [address] We discussed operative intervention  for this and the patient elected to proceed forward with operative  intervention after discussion of risk and benefits of procedure. We  discussed risks and benefits in detail, but limited to bleeding, blood  clots, infection, wound healing complications, damaged to neurovascular  structures, need for revision surgery, postoperative stiffness and  weakness, loss of limb and life. The patient understood these risks and  informed consent was obtained. OPERATIVE PROCEDURE:  On the day of surgery, the patient was met in the  preoperative area when informed consent was once again confirmed and  reviewed. The left upper extremity was marked. He received a block in  the preop area.   He was then taken back to the operative suite on [facility]  and then transferred over to 3080 table. Following anesthesia induction  and intubation, we then proceed to prep and drape the left upper  extremity in usual sterile fashion. A tourniquet was applied. Following prepping and draping, we awaited the 3 drying time minutes and had a  surgical time-out where the patient, procedure, and operative site were  confirmed and all were in agreement in the OR. The patient was given 2  gm of Ancef for prophylactically. We then inflated the tourniquet. We  then made a horizontal incision in the Rg's lines in the antecubital  fossa. We dissected down through skin and subcutaneous tissue, taking  care to achieve hemostasis with Bovie cautery. We then identified the  biceps tendon utilizing blunt dissection and grabbed with a Ashley clamp. After grabbing the biceps tendon, we proceeded to stitch it with a  FiberLoop from Arth[employer]. We bulletized it down to the appropriate size  and measured to 7-1/2 diameter. We then turned our attention to the  radial tuberosity which we identified utilizing blunt dissection and  hemostats. Once the radial tuberosity was identified, we used two baby  Bloom retractors. We used a curette and a key elevator to debride the  footprint of the biceps tendon. Following debridement, we confirmed our  position on fluoroscopy and drilled a bicortical pin following placement  of the bicortical pin we then drilled a unicortical 7-1/2 reamer over  the pin. Following this, we then assembled our button to the suture. We then placed the suture button bicortically and then tensioned the  biceps down to the appropriate length. We then locked it and then tied  our suture down. Following this, we then confirmed placement of button,  which was appropriate on fluoroscopy. We ranged the elbow through range  of motion and the biceps tendon was stable. The tourniquet was let down  and hemostasis was obtained.   We closed the incisions with 2-0, followed  by running Stratafix subcuticularly, followed by Dermabond. A soft  sterile dressing and Ace wrap from hand to shoulder. The patient was  then awoken from the anesthesia and taken to the PACU in stable  condition. AYAN LOMAX DO    D: 09/09/2022 13:08:58       T: 09/10/2022 1:20:08     [email or web address]  Job#: [id number]     Doc#: [id number]    CC:

## 2022-09-19 ENCOUNTER — TELEPHONE (OUTPATIENT)
Dept: ORTHOPEDIC SURGERY | Age: 62
End: 2022-09-19

## 2022-09-19 NOTE — TELEPHONE ENCOUNTER
Left  for patient to call back. I am trying to get a hold of his [facility] paperwork that he is needing completed. Asked patient to call back ASA so I can get this squared away for him, and signed on Wednesday by Dr. Arin Bauer.

## 2022-09-22 NOTE — TELEPHONE ENCOUNTER
Patient e-mailed me and provided a fax # that he wanted his FMLA sent to. This was completed for him. Faxed to: [phone]  Attn: Danielle Clay  (This was per the pt)    Informed him this was taken care of.

## 2022-09-28 ENCOUNTER — OFFICE VISIT (OUTPATIENT)
Dept: ORTHOPEDIC SURGERY | Age: 62
End: 2022-09-28

## 2022-09-28 VITALS — BODY MASS INDEX: 38.3 KG/M2 | HEIGHT: 73 IN | WEIGHT: 289 LBS | OXYGEN SATURATION: 98 %

## 2022-09-28 DIAGNOSIS — S46.212A BICEPS TENDON RUPTURE, LEFT, INITIAL ENCOUNTER: Primary | ICD-10-CM

## 2022-09-28 PROCEDURE — 99024 POSTOP FOLLOW-UP VISIT: CPT | Performed by: ORTHOPAEDIC SURGERY

## 2022-09-29 RX ORDER — IBUPROFEN 800 MG/1
800 TABLET ORAL 3 TIMES DAILY PRN
Qty: 90 TABLET | Refills: 0 | Status: SHIPPED | OUTPATIENT
Start: 2022-09-29

## 2022-09-29 ASSESSMENT — ENCOUNTER SYMPTOMS
EYE DISCHARGE: 0
ROS SKIN COMMENTS: NEGATIVE FOR RASH
ABDOMINAL PAIN: 0
SHORTNESS OF BREATH: 0

## 2022-09-29 NOTE — PROGRESS NOTES
[address] [place] [address] AND SPORTS MEDICINE  [address] [address]  Dept: [phone]  Dept Fax: [phone]        Postoperative follow-up note    Subjective:   Verner Harts is a 63y.o. year old male who presents to our office today for postoperative followup regarding his   1. Biceps tendon rupture, left, initial encounter    . Chief Complaint   Patient presents with    Elbow Injury     L distal biceps PO     Sreedhar Clifford is a 27-year-old male who presents the office today for recheck status post left distal biceps tendon reattachment on 9/9/2022. He is doing quite well and having minimal pain at this point. Review of Systems   Constitutional:  Positive for activity change. Negative for fever. HENT:  Negative for dental problem. Eyes:  Negative for discharge. Respiratory:  Negative for shortness of breath. Cardiovascular:  Negative for chest pain. Gastrointestinal:  Negative for abdominal pain. Genitourinary: Negative. Musculoskeletal:  Positive for arthralgias. Skin:         Negative for rash   Neurological:  Positive for weakness. Psychiatric/Behavioral:  Negative for confusion. I have reviewed the CC, HPI, ROS, PMH, FHX, Social History, and if not present in this note, I have reviewed in the patient's chart. I agree with the documentation provided by other staff and have reviewed their documentation prior to providing my signature indicating agreement. Objective :   General: Verner Harts is a 63 y.o. male who is alert and oriented and sitting comfortably in our office. Ortho Exam  MS:   Serous filled blister is noted to the medial aspect of his incision without any signs of infection. Rest of the incision is healing well without signs of infection to the left antecubital area.   Range of motion is 15 to 90 degrees with supination being 30 degrees and pronation being nearly full. Motor, sensory, vascular examination to the left upper extremity is grossly intact without focal deficits. Neuro: alert. oriented  Eyes: Extra-ocular muscles intact  Mouth: Oral mucosa moist. No perioral lesions  Pulm: Respirations unlabored and regular. Skin: warm, well perfused  Psych:   Patient has good fund of knowledge and displays understanging of exam, diagnosis, and plan. Radiology: XR ELBOW LEFT (MIN 3 VIEWS)    Result Date: 9/29/2022  History: Left distal biceps reattachment Findings: AP, lateral, oblique x-rays of the left elbow done in the office today shows a sequela biceps reattachment with hardware in good position without complications. There is no further evidence of fracture, subluxation, dislocation radiopaque foreign body or radiopaque tumor. Impression: Sequela of left distal biceps reattachment in good position as described above. XR ELBOW LEFT (MIN 3 VIEWS)    Result Date: 9/7/2022  History: Left elbow pain Findings: AP, lateral, oblique x-rays of the left elbow done in the office today shows mild to moderate degenerative changes diffusely about the elbow with mild joint space narrowing and para-articular osteophytosis as well as joint line sclerosis. No evidence of fracture, subluxation, dislocation, radiopaque foreign body, radiopaque tumors noted. Impression: Left elbow mild degenerative changes as described above. FLUORO FOR SURGICAL PROCEDURES    Result Date: 9/9/2022  Radiology exam is complete. No Radiologist dictation. Please follow up with ordering provider. Assessment:      1. Biceps tendon rupture, left, initial encounter           Plan:      We applied a dressing to the serous filled blister however as soon as the blister empties I would like the patient to keep that open to air.   If he has any problems with wound healing I would like him to contact me immediately otherwise he can continue to work on range of motion restoration gaining terminal extension and supination over the next 4 weeks. I plan to see him back in 4 weeks for further evaluation and most likely will start him in gentle strengthening with physical therapy at that time. Follow up: No follow-ups on file. Orders Placed This Encounter   Medications    ibuprofen (ADVIL;MOTRIN) 800 MG tablet     Sig: Take 1 tablet by mouth 3 times daily as needed for Pain     Dispense:  90 tablet     Refill:  0       No orders of the defined types were placed in this encounter. This note is created with the assistance of a speech recognition program.  While intending to generate a document that actually reflects the content of the visit, the document can still have some errors including those of syntax and sound a like substitutions which may escape proof reading.   In such instances, actual meaning can be extrapolated by contextual diversion      Electronically signed by Joel Sfiuentes DO, Elin Freund on 9/29/2022 at 7:49 AM

## 2022-11-10 ENCOUNTER — OFFICE VISIT (OUTPATIENT)
Dept: ORTHOPEDIC SURGERY | Age: 62
End: 2022-11-10

## 2022-11-10 VITALS — WEIGHT: 275.4 LBS | BODY MASS INDEX: 36.5 KG/M2 | HEIGHT: 73 IN

## 2022-11-10 DIAGNOSIS — S69.82XA TFCC (TRIANGULAR FIBROCARTILAGE COMPLEX) INJURY, LEFT, INITIAL ENCOUNTER: ICD-10-CM

## 2022-11-10 DIAGNOSIS — S46.212D RUPTURE OF LEFT DISTAL BICEPS TENDON, SUBSEQUENT ENCOUNTER: Primary | ICD-10-CM

## 2022-11-10 PROCEDURE — 99024 POSTOP FOLLOW-UP VISIT: CPT | Performed by: ORTHOPAEDIC SURGERY

## 2022-11-10 NOTE — LETTER
[facility] and Sports Medicine  [address] [address] [place] [id number]  Phone: [phone]  Fax: [phone] [place] [address], DO        November 10, 2022     Patient: Jennifer Farris   YOB: 1960   Date of Visit: 11/10/2022       To Whom It May Concern: It is my medical opinion that Tiny Galvin may return to work on 11/28/2022 with no restrictions. If you have any questions or concerns, please don't hesitate to call.     Sincerely,        Nathan Begum DO

## 2022-11-12 ASSESSMENT — ENCOUNTER SYMPTOMS
SHORTNESS OF BREATH: 0
EYE DISCHARGE: 0
ROS SKIN COMMENTS: NEGATIVE FOR RASH
ABDOMINAL PAIN: 0

## 2022-11-12 NOTE — PROGRESS NOTES
[address] [facility] SPORTS MEDICINE  [address] [facility]  [address] [address]  Dept: [phone]  Dept Fax: [phone]        Postoperative follow-up note    Subjective:   Jerri Hammond is a 63y.o. year old male who presents to our office today for postoperative followup regarding his   1. Rupture of left distal biceps tendon, subsequent encounter    2. TFCC (triangular fibrocartilage complex) injury, left, initial encounter    . Chief Complaint   Patient presents with    Post-Op Check     LEFT BICEPS REATTACHMENT     Bassam Acosta is a 82-year-old male who presents the office today for recheck status post left distal biceps tendon reattachment on 9/9/2022. He feels like he is doing well with the elbow. He is having wrist pain that he has had since surgery. That is his biggest complaint so far. He is continuing the rehab process. Review of Systems   Constitutional:  Positive for activity change. Negative for fever. HENT:  Negative for dental problem. Eyes:  Negative for discharge. Respiratory:  Negative for shortness of breath. Cardiovascular:  Negative for chest pain. Gastrointestinal:  Negative for abdominal pain. Genitourinary: Negative. Musculoskeletal:  Positive for arthralgias. Skin:         Negative for rash   Neurological:  Positive for weakness. Psychiatric/Behavioral:  Negative for confusion. I have reviewed the CC, HPI, ROS, PMH, FHX, Social History, and if not present in this note, I have reviewed in the patient's chart. I agree with the documentation provided by other staff and have reviewed their documentation prior to providing my signature indicating agreement. Objective :   General: Jerri Hammond is a 63 y.o. male who is alert and oriented and sitting comfortably in our office.   Ortho Exam  MS:   Incision left elbow is healing well without signs of infection. Mild increased hypertrophic scarring around the area of blistering is noted but no signs of infection or blistering is appreciated. Range of motion shows mild extensor lag and mild supination leg. Tenderness at the DRUJ and increased pain with the DRUJ shuck test is noted. Patient has nearly full range of motion of the left wrist.  Motor, sensory, vascular examination of the left upper extremity is otherwise grossly intact without focal deficits. Neuro: alert. oriented  Eyes: Extra-ocular muscles intact  Mouth: Oral mucosa moist. No perioral lesions  Pulm: Respirations unlabored and regular. Skin: warm, well perfused  Psych:   Patient has good fund of knowledge and displays understanging of exam, diagnosis, and plan. Radiology: No x-rays were taken in the office today. Assessment:      1. Rupture of left distal biceps tendon, subsequent encounter    2. TFCC (triangular fibrocartilage complex) injury, left, initial encounter           Plan: It is possible the patient has a TFCC injury. We discussed that today. At this point he does not want anything specific done for it but consideration for injection could be made in the future as well as advanced imaging. He is going to continue working with physical therapy for strengthening program.  I plan to see him back in 6 to 8 weeks for further evaluation or sooner as necessary. Follow up: No follow-ups on file. No orders of the defined types were placed in this encounter.       Orders Placed This Encounter   Procedures    External Referral To Physical Therapy     Referral Priority:   Routine     Referral Type:   Eval and Treat     Referral Reason:   Specialty Services Required     Requested Specialty:   Physical Therapist     Number of Visits Requested:   1       This note is created with the assistance of a speech recognition program.  While intending to generate a document that actually reflects the content of the visit, the document can still have some errors including those of syntax and sound a like substitutions which may escape proof reading.   In such instances, actual meaning can be extrapolated by contextual diversion      Electronically signed by Chase Meraz DO, Ransom Homme on 11/12/2022 at 10:29 AM

## 2023-01-14 DIAGNOSIS — S46.212A BICEPS TENDON RUPTURE, LEFT, INITIAL ENCOUNTER: ICD-10-CM

## 2023-01-16 RX ORDER — IBUPROFEN 800 MG/1
TABLET ORAL
Qty: 90 TABLET | Refills: 0 | OUTPATIENT
Start: 2023-01-16

## 2023-01-25 DIAGNOSIS — S46.212A BICEPS TENDON RUPTURE, LEFT, INITIAL ENCOUNTER: Primary | ICD-10-CM

## 2023-01-25 RX ORDER — IBUPROFEN 800 MG/1
800 TABLET ORAL
Qty: 180 TABLET | Refills: 2 | Status: SHIPPED | OUTPATIENT
Start: 2023-01-25

## 2023-04-06 ENCOUNTER — OFFICE VISIT (OUTPATIENT)
Dept: ORTHOPEDIC SURGERY | Age: 63
End: 2023-04-06

## 2023-04-06 VITALS — BODY MASS INDEX: 36.45 KG/M2 | HEIGHT: 73 IN | WEIGHT: 275 LBS | RESPIRATION RATE: 14 BRPM

## 2023-04-06 DIAGNOSIS — M25.532 LEFT WRIST PAIN: Primary | ICD-10-CM

## 2023-04-06 RX ORDER — METHYLPREDNISOLONE ACETATE 40 MG/ML
40 INJECTION, SUSPENSION INTRA-ARTICULAR; INTRALESIONAL; INTRAMUSCULAR; SOFT TISSUE ONCE
Status: COMPLETED | OUTPATIENT
Start: 2023-04-06 | End: 2023-04-06

## 2023-04-06 RX ORDER — LIDOCAINE HYDROCHLORIDE 10 MG/ML
0.5 INJECTION, SOLUTION INFILTRATION; PERINEURAL ONCE
Status: COMPLETED | OUTPATIENT
Start: 2023-04-06 | End: 2023-04-06

## 2023-04-06 RX ADMIN — METHYLPREDNISOLONE ACETATE 40 MG: 40 INJECTION, SUSPENSION INTRA-ARTICULAR; INTRALESIONAL; INTRAMUSCULAR; SOFT TISSUE at 11:05

## 2023-04-06 RX ADMIN — LIDOCAINE HYDROCHLORIDE 0.5 ML: 10 INJECTION, SOLUTION INFILTRATION; PERINEURAL at 11:05

## 2023-04-07 ASSESSMENT — ENCOUNTER SYMPTOMS
EYE DISCHARGE: 0
SHORTNESS OF BREATH: 0
ROS SKIN COMMENTS: NEGATIVE FOR RASH
ABDOMINAL PAIN: 0

## 2023-04-07 NOTE — PROGRESS NOTES
instances, actual meaning can be extrapolated by contextual diversion      Electronically signed by Farhana Jiménez DO, Debarah Lye on 4/7/2023 at 8:05 AM

## 2024-02-16 DIAGNOSIS — S46.212A BICEPS TENDON RUPTURE, LEFT, INITIAL ENCOUNTER: ICD-10-CM

## 2024-02-16 RX ORDER — IBUPROFEN 800 MG/1
800 TABLET ORAL
Qty: 180 TABLET | Refills: 2 | Status: SHIPPED | OUTPATIENT
Start: 2024-02-16

## 2024-08-20 NOTE — H&P
show
narrow Yes    NECK: neck supple, no lymphadenopathy noted, trachea midline and straight       2+ carotid, no bruit    LUNGS: Chest expands equally bilaterally upon respiration, no accessory muscle used. Ausculation reveals no adventitious breath sounds. CARDIOVASCULAR: \"Heart sounds are normal.  Regular rate and rhythm without murmur,    ABDOMEN: Bowel sounds are present in all four quadrants      GENATALIA:Deferred. NEUROLOGIC: \"CN II-XII are grossly intact. EXTREMITIES: Pitting edema:  No,  Varicose veins: No     Dorsal pedal/posterior tibial pulses palpable: Yes         Strength:  Normal       There is no problem list on file for this patient. IMPRESSIONS:   1. Left hip DJD   2. does not have a problem list on file.     [facility]  Electronically signed 7/29/2019 at 9:51 AM       Scheduled for: left total hip arthroplasty

## (undated) DEVICE — PADDING,UNDERCAST,COTTON, 4"X4YD STERILE: Brand: MEDLINE

## (undated) DEVICE — SUTURE STRATAFIX SYMMETRIC PDS + SZ 0 L18IN ABSRB L36MM SXPP1A401

## (undated) DEVICE — GLOVE ORANGE PI 8   MSG9080

## (undated) DEVICE — GLOVE ORANGE PI 8 1/2   MSG9085

## (undated) DEVICE — ADHESIVE SKIN CLOSURE TOP 36 CC HI VISC DERMBND MINI

## (undated) DEVICE — SOLUTION IV IRRIG POUR BRL 0.9% SODIUM CHL 2F7124

## (undated) DEVICE — SYRINGE IRRIG 60ML SFT PLIABLE BLB EZ TO GRP 1 HND USE W/

## (undated) DEVICE — SUTURE VCRL SZ 0 L36IN ABSRB UD L36MM CT-1 1/2 CIR J946H

## (undated) DEVICE — ADHESIVE SKIN CLSR 0.7ML TOP DERMBND ADV

## (undated) DEVICE — 3M™ IOBAN™ 2 ANTIMICROBIAL INCISE DRAPE 6650EZ: Brand: IOBAN™ 2

## (undated) DEVICE — STERILE PATIENT PROTECTIVE PAD FOR IMP® KNEE POSITIONERS & COHESIVE WRAP (10 / CASE): Brand: DE MAYO KNEE POSITIONER®

## (undated) DEVICE — CHLORAPREP 26ML ORANGE

## (undated) DEVICE — BIPOLAR SEALER 23-112-1 AQM 6.0: Brand: AQUAMANTYS ®

## (undated) DEVICE — BANDAGE COBAN 4 IN COMPR W4INXL5YD FOAM COHESIVE QUIK STK SELF ADH SFT

## (undated) DEVICE — Device

## (undated) DEVICE — SUTURE STRATAFIX SPRL SZ 1 L14IN ABSRB VLT L48CM CTX 1/2 SXPD2B405

## (undated) DEVICE — STERILE PVP: Brand: MEDLINE INDUSTRIES, INC.

## (undated) DEVICE — DRAPE SURGICAL HAND PROX AURORA

## (undated) DEVICE — OPTIFOAM GENTLE AG,POST-OP STRIP,3.5X10: Brand: MEDLINE

## (undated) DEVICE — LIMB HOLDER, WRIST/ANKLE: Brand: DEROYAL

## (undated) DEVICE — MHPB TOTAL HIP PACK: Brand: MEDLINE INDUSTRIES, INC.

## (undated) DEVICE — HOOD: Brand: FLYTE

## (undated) DEVICE — SHEET, ORTHO, SPLIT, STERILE: Brand: MEDLINE

## (undated) DEVICE — GLOVE SURG SZ 85 L12IN FNGR ORTHO 126MIL CRM LTX FREE

## (undated) DEVICE — C-ARM: Brand: UNBRANDED

## (undated) DEVICE — APPLICATOR MEDICATED 26 CC SOLUTION HI LT ORNG CHLORAPREP

## (undated) DEVICE — KIT INSTR W/ 2.4MM GUIDEPIN SUT PASS WIRE NO2 FIBERWIRE

## (undated) DEVICE — RENTAL TABLE ATTACHMENT

## (undated) DEVICE — E-Z CLEAN, NON-STICK, PTFE COATED, ELECTROSURGICAL BLADE ELECTRODE, 6.5 INCH (16.5 CM): Brand: MEGADYNE

## (undated) DEVICE — SUTURE ABSRB BRAID COAT UD CP NO 2 27IN VCRL J195H

## (undated) DEVICE — DRESSING FOAM W4XL4IN AG SIL FACE BORD IONIC ANTIMIC ADH

## (undated) DEVICE — TOTAL TRAY, 16FR 10ML SIL FOLEY, URN: Brand: MEDLINE

## (undated) DEVICE — GLOVE SURG SZ 85 CRM LTX FREE POLYISOPRENE POLYMER BEAD ANTI

## (undated) DEVICE — IMMOBILIZER ORTH CONTACT CLOSURE STRP LG 18X9 IN PROCARE

## (undated) DEVICE — ZIPPERED TOGA, 2X LARGE: Brand: FLYTE

## (undated) DEVICE — BNDG,ELSTC,MATRIX,STRL,4"X5YD,LF,HOOK&LP: Brand: MEDLINE

## (undated) DEVICE — SUTURE VCRL SZ 2-0 L27IN ABSRB UD L26MM CT-2 1/2 CIR J269H

## (undated) DEVICE — GOWN,AURORA,NONRNF,XL,30/CS: Brand: MEDLINE

## (undated) DEVICE — 6619 2 PTNT ISO SYS INCISE AREA&LT;(&GT;&&LT;)&GT;P: Brand: STERI-DRAPE™ IOBAN™ 2

## (undated) DEVICE — 3M™ COBAN™ NL STERILE NON-LATEX SELF-ADHERENT WRAP, 2086S, 6 IN X 5 YD (15 CM X 4,5 M), 12 ROLLS/CASE: Brand: 3M™ COBAN™

## (undated) DEVICE — ZIPPERED TOGA, X-LARGE: Brand: FLYTE

## (undated) DEVICE — 3M™ IOBAN™ 2 ANTIMICROBIAL INCISE DRAPE 6651EZ: Brand: IOBAN™ 2

## (undated) DEVICE — SUTURE STRATAFIX SPRL SZ 3-0 L5IN ABSRB UD FS L26MM 3/8 CIR SXMP2B411

## (undated) DEVICE — SUTURE VCRL SZ 0 L27IN ABSRB UD L36MM CT-1 1/2 CIR J260H

## (undated) DEVICE — Z DISCONTINUED CATHETER BALLOON DIL BILI 0.035 IN 5 FRX180 CM MAXFORC [M00567410] [STRYKER CORP]

## (undated) DEVICE — OPTIFOAM GENTLE AG,POST-OP STRIP,3.5X14: Brand: MEDLINE

## (undated) DEVICE — DRAPE,HIP,W/POUCHES,STERILE: Brand: MEDLINE

## (undated) DEVICE — DRAPE,U/ SHT,SPLIT,PLAS,STERIL: Brand: MEDLINE

## (undated) DEVICE — SUTURE STRATAFIX SZ 3-0 L30CM NONABSORBABLE UD L19MM FS-2 SXMP2B408

## (undated) DEVICE — DRAPE,REIN 53X77,STERILE: Brand: MEDLINE

## (undated) DEVICE — CORD,CAUTERY,BIPOLAR,STERILE: Brand: MEDLINE

## (undated) DEVICE — GOWN,SIRUS,POLYRNF,BRTHSLV,XL,30/CS: Brand: MEDLINE

## (undated) DEVICE — PENCIL SMK EVAC ZIP PEN

## (undated) DEVICE — SHEET DRAPE FULL 70X100